# Patient Record
Sex: FEMALE | Race: WHITE | NOT HISPANIC OR LATINO | Employment: OTHER | RURAL
[De-identification: names, ages, dates, MRNs, and addresses within clinical notes are randomized per-mention and may not be internally consistent; named-entity substitution may affect disease eponyms.]

---

## 2020-08-19 ENCOUNTER — HISTORICAL (OUTPATIENT)
Dept: ADMINISTRATIVE | Facility: HOSPITAL | Age: 75
End: 2020-08-19

## 2021-10-05 ENCOUNTER — HOSPITAL ENCOUNTER (OUTPATIENT)
Dept: RADIOLOGY | Facility: HOSPITAL | Age: 76
Discharge: HOME | End: 2021-10-05
Payer: MEDICARE

## 2021-10-05 VITALS — WEIGHT: 163 LBS | BODY MASS INDEX: 28.88 KG/M2 | HEIGHT: 63 IN

## 2021-10-05 DIAGNOSIS — Z12.31 VISIT FOR SCREENING MAMMOGRAM: ICD-10-CM

## 2021-10-05 PROCEDURE — 77067 SCR MAMMO BI INCL CAD: CPT | Mod: 26,,, | Performed by: RADIOLOGY

## 2021-10-05 PROCEDURE — 77067 MAMMO DIGITAL SCREENING BILAT: ICD-10-PCS | Mod: 26,,, | Performed by: RADIOLOGY

## 2021-10-05 PROCEDURE — 77067 SCR MAMMO BI INCL CAD: CPT | Mod: TC

## 2022-11-10 ENCOUNTER — HOSPITAL ENCOUNTER (OUTPATIENT)
Dept: RADIOLOGY | Facility: HOSPITAL | Age: 77
Discharge: HOME | End: 2022-11-10
Payer: MEDICARE

## 2022-11-10 DIAGNOSIS — Z12.31 VISIT FOR SCREENING MAMMOGRAM: ICD-10-CM

## 2022-11-10 PROCEDURE — 77067 SCR MAMMO BI INCL CAD: CPT | Mod: TC

## 2022-11-10 PROCEDURE — 77067 SCR MAMMO BI INCL CAD: CPT | Mod: 26,,, | Performed by: RADIOLOGY

## 2022-11-10 PROCEDURE — 77067 MAMMO DIGITAL SCREENING BILAT: ICD-10-PCS | Mod: 26,,, | Performed by: RADIOLOGY

## 2022-11-29 ENCOUNTER — HOSPITAL ENCOUNTER (OUTPATIENT)
Dept: RADIOLOGY | Facility: HOSPITAL | Age: 77
Discharge: HOME | End: 2022-11-29
Attending: NURSE PRACTITIONER
Payer: MEDICARE

## 2022-11-29 DIAGNOSIS — R05.1 ACUTE COUGH: ICD-10-CM

## 2022-11-29 DIAGNOSIS — R05.1 ACUTE COUGH: Primary | ICD-10-CM

## 2022-11-29 PROCEDURE — 71046 X-RAY EXAM CHEST 2 VIEWS: CPT | Mod: TC

## 2023-01-10 LAB
LEFT EYE DM RETINOPATHY: NEGATIVE
RIGHT EYE DM RETINOPATHY: NEGATIVE

## 2023-02-01 ENCOUNTER — OFFICE VISIT (OUTPATIENT)
Dept: FAMILY MEDICINE | Facility: CLINIC | Age: 78
End: 2023-02-01
Payer: MEDICARE

## 2023-02-01 VITALS
BODY MASS INDEX: 28.7 KG/M2 | HEART RATE: 73 BPM | WEIGHT: 162 LBS | DIASTOLIC BLOOD PRESSURE: 75 MMHG | SYSTOLIC BLOOD PRESSURE: 135 MMHG | HEIGHT: 63 IN

## 2023-02-01 DIAGNOSIS — K44.9 HIATAL HERNIA: ICD-10-CM

## 2023-02-01 DIAGNOSIS — K21.9 GASTROESOPHAGEAL REFLUX DISEASE, UNSPECIFIED WHETHER ESOPHAGITIS PRESENT: ICD-10-CM

## 2023-02-01 DIAGNOSIS — E03.9 HYPOTHYROIDISM, UNSPECIFIED TYPE: Primary | ICD-10-CM

## 2023-02-01 DIAGNOSIS — I10 ESSENTIAL HYPERTENSION: ICD-10-CM

## 2023-02-01 DIAGNOSIS — E78.5 HYPERLIPIDEMIA, UNSPECIFIED HYPERLIPIDEMIA TYPE: ICD-10-CM

## 2023-02-01 DIAGNOSIS — M1A.9XX0 CHRONIC GOUT INVOLVING TOE OF LEFT FOOT WITHOUT TOPHUS, UNSPECIFIED CAUSE: ICD-10-CM

## 2023-02-01 DIAGNOSIS — E11.9 TYPE 2 DIABETES MELLITUS WITHOUT COMPLICATION, WITHOUT LONG-TERM CURRENT USE OF INSULIN: ICD-10-CM

## 2023-02-01 DIAGNOSIS — U09.9 POST-COVID SYNDROME: ICD-10-CM

## 2023-02-01 DIAGNOSIS — D50.8 IRON DEFICIENCY ANEMIA SECONDARY TO INADEQUATE DIETARY IRON INTAKE: ICD-10-CM

## 2023-02-01 DIAGNOSIS — J30.2 SEASONAL ALLERGIES: ICD-10-CM

## 2023-02-01 DIAGNOSIS — H40.9 GLAUCOMA OF BOTH EYES, UNSPECIFIED GLAUCOMA TYPE: ICD-10-CM

## 2023-02-01 PROBLEM — M10.9 GOUT: Status: ACTIVE | Noted: 2023-02-01

## 2023-02-01 PROBLEM — E11.42 POLYNEUROPATHY DUE TO TYPE 2 DIABETES MELLITUS: Status: ACTIVE | Noted: 2023-02-01

## 2023-02-01 PROBLEM — G25.81 RESTLESS LEGS SYNDROME: Status: ACTIVE | Noted: 2023-02-01

## 2023-02-01 PROBLEM — J30.1 ALLERGIC RHINITIS DUE TO POLLEN: Status: ACTIVE | Noted: 2023-02-01

## 2023-02-01 LAB
ALBUMIN SERPL BCP-MCNC: 3.5 G/DL (ref 3.5–5)
ALBUMIN/GLOB SERPL: 1 {RATIO}
ALP SERPL-CCNC: 110 U/L (ref 55–142)
ALT SERPL W P-5'-P-CCNC: 19 U/L (ref 13–56)
ANION GAP SERPL CALCULATED.3IONS-SCNC: 14 MMOL/L (ref 7–16)
AST SERPL W P-5'-P-CCNC: 11 U/L (ref 15–37)
BASOPHILS # BLD AUTO: 0.04 K/UL (ref 0–0.2)
BASOPHILS NFR BLD AUTO: 0.5 % (ref 0–1)
BILIRUB SERPL-MCNC: 0.4 MG/DL (ref ?–1.2)
BUN SERPL-MCNC: 27 MG/DL (ref 7–18)
BUN/CREAT SERPL: 24 (ref 6–20)
CALCIUM SERPL-MCNC: 9.9 MG/DL (ref 8.5–10.1)
CHLORIDE SERPL-SCNC: 106 MMOL/L (ref 98–107)
CHOLEST SERPL-MCNC: 163 MG/DL (ref 0–200)
CHOLEST/HDLC SERPL: 4.7 {RATIO}
CO2 SERPL-SCNC: 25 MMOL/L (ref 21–32)
CREAT SERPL-MCNC: 1.12 MG/DL (ref 0.55–1.02)
DIFFERENTIAL METHOD BLD: ABNORMAL
EGFR (NO RACE VARIABLE) (RUSH/TITUS): 51 ML/MIN/1.73M²
EOSINOPHIL # BLD AUTO: 0.16 K/UL (ref 0–0.5)
EOSINOPHIL NFR BLD AUTO: 2.2 % (ref 1–4)
ERYTHROCYTE [DISTWIDTH] IN BLOOD BY AUTOMATED COUNT: 18.1 % (ref 11.5–14.5)
EST. AVERAGE GLUCOSE BLD GHB EST-MCNC: 117 MG/DL
GLOBULIN SER-MCNC: 3.4 G/DL (ref 2–4)
GLUCOSE SERPL-MCNC: 122 MG/DL (ref 74–106)
HBA1C MFR BLD HPLC: 6.1 % (ref 4.5–6.6)
HCT VFR BLD AUTO: 39.8 % (ref 38–47)
HDLC SERPL-MCNC: 35 MG/DL (ref 40–60)
HGB BLD-MCNC: 12.3 G/DL (ref 12–16)
IMM GRANULOCYTES # BLD AUTO: 0.09 K/UL (ref 0–0.04)
IMM GRANULOCYTES NFR BLD: 1.2 % (ref 0–0.4)
LDLC SERPL CALC-MCNC: 70 MG/DL
LDLC/HDLC SERPL: 2 {RATIO}
LYMPHOCYTES # BLD AUTO: 1.54 K/UL (ref 1–4.8)
LYMPHOCYTES NFR BLD AUTO: 20.9 % (ref 27–41)
MCH RBC QN AUTO: 28 PG (ref 27–31)
MCHC RBC AUTO-ENTMCNC: 30.9 G/DL (ref 32–36)
MCV RBC AUTO: 90.5 FL (ref 80–96)
MONOCYTES # BLD AUTO: 0.55 K/UL (ref 0–0.8)
MONOCYTES NFR BLD AUTO: 7.5 % (ref 2–6)
MPC BLD CALC-MCNC: 11 FL (ref 9.4–12.4)
NEUTROPHILS # BLD AUTO: 4.99 K/UL (ref 1.8–7.7)
NEUTROPHILS NFR BLD AUTO: 67.7 % (ref 53–65)
NONHDLC SERPL-MCNC: 128 MG/DL
NRBC # BLD AUTO: 0 X10E3/UL
NRBC, AUTO (.00): 0 %
PLATELET # BLD AUTO: 288 K/UL (ref 150–400)
POTASSIUM SERPL-SCNC: 4.5 MMOL/L (ref 3.5–5.1)
PROT SERPL-MCNC: 6.9 G/DL (ref 6.4–8.2)
RBC # BLD AUTO: 4.4 M/UL (ref 4.2–5.4)
SODIUM SERPL-SCNC: 140 MMOL/L (ref 136–145)
TRIGL SERPL-MCNC: 288 MG/DL (ref 35–150)
TSH SERPL DL<=0.005 MIU/L-ACNC: 1.83 UIU/ML (ref 0.36–3.74)
URATE SERPL-MCNC: 4.9 MG/DL (ref 2.6–6)
VLDLC SERPL-MCNC: 58 MG/DL
WBC # BLD AUTO: 7.37 K/UL (ref 4.5–11)

## 2023-02-01 PROCEDURE — 85025 CBC WITH DIFFERENTIAL: ICD-10-PCS | Mod: ,,, | Performed by: CLINICAL MEDICAL LABORATORY

## 2023-02-01 PROCEDURE — 80061 LIPID PANEL: CPT | Mod: ,,, | Performed by: CLINICAL MEDICAL LABORATORY

## 2023-02-01 PROCEDURE — 84550 URIC ACID: ICD-10-PCS | Mod: ,,, | Performed by: CLINICAL MEDICAL LABORATORY

## 2023-02-01 PROCEDURE — 83036 HEMOGLOBIN A1C: ICD-10-PCS | Mod: ,,, | Performed by: CLINICAL MEDICAL LABORATORY

## 2023-02-01 PROCEDURE — 80061 LIPID PANEL: ICD-10-PCS | Mod: ,,, | Performed by: CLINICAL MEDICAL LABORATORY

## 2023-02-01 PROCEDURE — 99204 OFFICE O/P NEW MOD 45 MIN: CPT | Mod: ,,, | Performed by: FAMILY MEDICINE

## 2023-02-01 PROCEDURE — 80053 COMPREHEN METABOLIC PANEL: CPT | Mod: ,,, | Performed by: CLINICAL MEDICAL LABORATORY

## 2023-02-01 PROCEDURE — 80053 COMPREHENSIVE METABOLIC PANEL: ICD-10-PCS | Mod: ,,, | Performed by: CLINICAL MEDICAL LABORATORY

## 2023-02-01 PROCEDURE — 83036 HEMOGLOBIN GLYCOSYLATED A1C: CPT | Mod: ,,, | Performed by: CLINICAL MEDICAL LABORATORY

## 2023-02-01 PROCEDURE — 85025 COMPLETE CBC W/AUTO DIFF WBC: CPT | Mod: ,,, | Performed by: CLINICAL MEDICAL LABORATORY

## 2023-02-01 PROCEDURE — 99204 PR OFFICE/OUTPT VISIT, NEW, LEVL IV, 45-59 MIN: ICD-10-PCS | Mod: ,,, | Performed by: FAMILY MEDICINE

## 2023-02-01 PROCEDURE — 84443 TSH: ICD-10-PCS | Mod: ,,, | Performed by: CLINICAL MEDICAL LABORATORY

## 2023-02-01 PROCEDURE — 84443 ASSAY THYROID STIM HORMONE: CPT | Mod: ,,, | Performed by: CLINICAL MEDICAL LABORATORY

## 2023-02-01 PROCEDURE — 84550 ASSAY OF BLOOD/URIC ACID: CPT | Mod: ,,, | Performed by: CLINICAL MEDICAL LABORATORY

## 2023-02-01 RX ORDER — PRAVASTATIN SODIUM 40 MG/1
40 TABLET ORAL DAILY
Qty: 90 TABLET | Refills: 0 | Status: SHIPPED | OUTPATIENT
Start: 2023-02-01 | End: 2023-05-01 | Stop reason: SDUPTHER

## 2023-02-01 RX ORDER — LEVOTHYROXINE SODIUM 25 UG/1
25 TABLET ORAL
COMMUNITY
Start: 2023-01-24 | End: 2023-02-01 | Stop reason: SDUPTHER

## 2023-02-01 RX ORDER — PRAVASTATIN SODIUM 40 MG/1
1 TABLET ORAL DAILY
COMMUNITY
End: 2023-02-01 | Stop reason: SDUPTHER

## 2023-02-01 RX ORDER — GABAPENTIN 300 MG/1
1 CAPSULE ORAL
COMMUNITY
End: 2023-08-28 | Stop reason: SDUPTHER

## 2023-02-01 RX ORDER — FLUTICASONE PROPIONATE 50 MCG
SPRAY, SUSPENSION (ML) NASAL
COMMUNITY
Start: 2022-12-28

## 2023-02-01 RX ORDER — LEVOTHYROXINE SODIUM 25 UG/1
25 TABLET ORAL
Qty: 90 TABLET | Refills: 0 | Status: SHIPPED | OUTPATIENT
Start: 2023-02-01 | End: 2023-11-09 | Stop reason: SDUPTHER

## 2023-02-01 RX ORDER — OMEPRAZOLE 20 MG/1
20 CAPSULE, DELAYED RELEASE ORAL DAILY
COMMUNITY
Start: 2022-12-21 | End: 2023-02-01

## 2023-02-01 RX ORDER — ALLOPURINOL 300 MG/1
300 TABLET ORAL DAILY
Qty: 90 TABLET | Refills: 0 | Status: SHIPPED | OUTPATIENT
Start: 2023-02-01 | End: 2023-05-01 | Stop reason: SDUPTHER

## 2023-02-01 RX ORDER — GEMFIBROZIL 600 MG/1
600 TABLET, FILM COATED ORAL
COMMUNITY
Start: 2022-12-21 | End: 2023-02-01 | Stop reason: SDUPTHER

## 2023-02-01 RX ORDER — FERROUS SULFATE TAB 325 MG (65 MG ELEMENTAL FE) 325 (65 FE) MG
325 TAB ORAL EVERY OTHER DAY
COMMUNITY
Start: 2022-12-28 | End: 2023-10-02

## 2023-02-01 RX ORDER — CHLORTHALIDONE 25 MG/1
25 TABLET ORAL EVERY MORNING
COMMUNITY
Start: 2022-12-15 | End: 2023-06-21 | Stop reason: SDUPTHER

## 2023-02-01 RX ORDER — NIFEDIPINE 30 MG/1
30 TABLET, FILM COATED, EXTENDED RELEASE ORAL DAILY
COMMUNITY
Start: 2022-09-30 | End: 2023-02-01 | Stop reason: SDUPTHER

## 2023-02-01 RX ORDER — ALLOPURINOL 300 MG/1
300 TABLET ORAL
COMMUNITY
Start: 2022-09-30 | End: 2023-02-01 | Stop reason: SDUPTHER

## 2023-02-01 RX ORDER — GLIMEPIRIDE 1 MG/1
1 TABLET ORAL
COMMUNITY
Start: 2022-10-09 | End: 2023-02-01 | Stop reason: SDUPTHER

## 2023-02-01 RX ORDER — PANTOPRAZOLE SODIUM 40 MG/1
40 TABLET, DELAYED RELEASE ORAL DAILY
Qty: 90 TABLET | Refills: 3 | Status: SHIPPED | OUTPATIENT
Start: 2023-02-01 | End: 2023-11-09 | Stop reason: SDUPTHER

## 2023-02-01 RX ORDER — OMEPRAZOLE 20 MG/1
20 CAPSULE, DELAYED RELEASE ORAL DAILY
Qty: 90 CAPSULE | Refills: 0 | Status: CANCELLED | OUTPATIENT
Start: 2023-02-01

## 2023-02-01 RX ORDER — LOSARTAN POTASSIUM 100 MG/1
100 TABLET ORAL DAILY
Qty: 90 TABLET | Refills: 0 | Status: SHIPPED | OUTPATIENT
Start: 2023-02-01 | End: 2023-07-19 | Stop reason: SDUPTHER

## 2023-02-01 RX ORDER — GEMFIBROZIL 600 MG/1
600 TABLET, FILM COATED ORAL DAILY
Qty: 90 TABLET | Refills: 0 | Status: SHIPPED | OUTPATIENT
Start: 2023-02-01 | End: 2023-05-01 | Stop reason: SDUPTHER

## 2023-02-01 RX ORDER — GLIMEPIRIDE 1 MG/1
1 TABLET ORAL
Qty: 90 TABLET | Refills: 0 | Status: SHIPPED | OUTPATIENT
Start: 2023-02-01 | End: 2023-08-01

## 2023-02-01 RX ORDER — NIFEDIPINE 30 MG/1
30 TABLET, FILM COATED, EXTENDED RELEASE ORAL DAILY
Qty: 90 TABLET | Refills: 0 | Status: SHIPPED | OUTPATIENT
Start: 2023-02-01 | End: 2023-08-01 | Stop reason: SDUPTHER

## 2023-02-01 RX ORDER — LOSARTAN POTASSIUM 100 MG/1
1 TABLET ORAL DAILY
COMMUNITY
End: 2023-02-01 | Stop reason: SDUPTHER

## 2023-02-01 RX ORDER — LATANOPROST 50 UG/ML
1 SOLUTION/ DROPS OPHTHALMIC NIGHTLY
COMMUNITY
Start: 2022-08-17

## 2023-02-01 RX ORDER — METFORMIN HYDROCHLORIDE 850 MG/1
850 TABLET ORAL 2 TIMES DAILY
COMMUNITY
Start: 2022-11-15 | End: 2023-08-03

## 2023-02-01 NOTE — PROGRESS NOTES
"              Westley Lim IV, DO  [facility]  [address] [place] [address]  Phone: [phone]      Subjective     Name: Frannie Wetzel   Sex: female  YOB: 1945 (77 y.o.)  MRN: [id number]  Visit Date: 02/01/2023   Chief Complaint: Establish Care (Pt needing a new pcp)        HISTORY OF PRESENT ILLNESS:    Patient presents clinic today to establish care.  She has a medical history inclusive of diabetes type 2 with polyneuropathy, restless leg syndrome cough, glaucoma, seasonal allergies, essential hypertension, hyperlipidemia, iron deficiency anemia, hypothyroidism, gastroesophageal reflux disease, with hiatal hernia, gout, and possibly post COVID syndrome.  We talked about all these problems today and we are addressing them wanting Inna.  Medication changes that will take place today he is patient is currently on iron to be taken b.i.d. we are going to change this to q.o.d. as absorption is unlikely to be improved by taking more often than that.  Also we will be stopping the patient's of omeprazole because she is taking 1 of those +and over-the-counter to assist.  We will be changing this to Protonix 40 mg p.o. q.d. lab results reviewed from previous visits and appropriate labs will be ordered today.  Patient's only complaint for the day will be weakness and fatigue.  For this we will be getting a complete blood count, TSH, and a comprehensive metabolic panel.  Patient is also due A1c as well as a lipid panel.  Last uric acid was less than 7 and we will be repeating today for stratification.  No further changes on this visit.  Patient was invited to return to the clinic no later than 3 months from now for follow-up on diabetes.  We will be trying to lower the number of this patient's medications to avoid polypharmacy.      Portions of this note may have been created with voice recognition software. Occasional "wrong-word" or "sound-a-like" substitutions may have " occurred due to the inherent limitations of voice recognition software. Please, read the note carefully and recognize, using context, where substitutions have occurred.     PAST MEDICAL HISTORY:  Significant Diagnoses - Patient  has no past medical history on file.  Medications - Patient has a current medication list which includes the following long-term medication(s): chlorthalidone, gabapentin, latanoprost, metformin, gemfibrozil, glimepiride, levothyroxine, losartan, nifedipine, pantoprazole, and pravastatin.   Allergies - Patient has No Known Allergies.  Surgeries - Patient  has no past surgical history on file.  Family History - Patient family history is not on file.      SOCIAL HISTORY:  Tobacco - Patient  reports that she has never smoked. She has never used smokeless tobacco.   Alcohol - Patient  has no history on file for alcohol use.   Recreational Drugs - Patient  has no history on file for drug use.       Review of Systems   Constitutional:  Negative for fatigue and fever.   HENT:  Negative for nasal congestion and sore throat.    Respiratory:  Negative for cough and shortness of breath.    Cardiovascular:  Negative for chest pain.   Gastrointestinal:  Negative for abdominal pain, nausea and vomiting.   Genitourinary:  Negative for dysuria.   Musculoskeletal:  Negative for myalgias.   Integumentary:  Negative for rash.   Neurological:  Negative for dizziness, weakness and headaches.        No past medical history on file.     Review of patient's allergies indicates:  No Known Allergies     No past surgical history on file.     No family history on file.    Current Outpatient Medications   Medication Instructions    allopurinoL (ZYLOPRIM) 300 mg, Oral, Daily    chlorthalidone (HYGROTEN) 25 mg, Oral, Every morning    FeroSuL 325 mg, Oral, Every other day    fluticasone propionate (FLONASE) 50 mcg/actuation nasal spray SMARTSI Puff(s) Both Nares Daily    gabapentin (NEURONTIN) 300 MG capsule 1  "capsule    gemfibroziL (LOPID) 600 mg, Oral, Daily    glimepiride (AMARYL) 1 mg, Oral, With breakfast    latanoprost 0.005 % ophthalmic solution 1 drop, Both Eyes, Nightly    levothyroxine (SYNTHROID) 25 mcg, Oral, Before breakfast    losartan (COZAAR) 100 mg, Oral, Daily    metFORMIN (GLUCOPHAGE) 850 mg, Oral, 2 times daily    NIFEdipine (ADALAT CC) 30 mg, Oral, Daily    pantoprazole (PROTONIX) 40 mg, Oral, Daily    pravastatin (PRAVACHOL) 40 mg, Oral, Daily        Objective     /75   Pulse 73   Ht 5' 3" (1.6 m)   Wt 73.5 kg (162 lb)   BMI 28.70 kg/m²     Physical Exam  Constitutional:       General: She is not in acute distress.     Appearance: Normal appearance. She is not ill-appearing.   HENT:      Head: Normocephalic and atraumatic.      Right Ear: External ear normal.      Left Ear: External ear normal.   Eyes:      Extraocular Movements: Extraocular movements intact.      Conjunctiva/sclera: Conjunctivae normal.   Cardiovascular:      Rate and Rhythm: Normal rate.      Heart sounds: No murmur heard.  Pulmonary:      Effort: Pulmonary effort is normal.   Musculoskeletal:      Cervical back: Normal range of motion.   Skin:     General: Skin is warm and dry.      Coloration: Skin is not jaundiced.      Findings: No rash.   Neurological:      Mental Status: She is alert.   Psychiatric:         Mood and Affect: Mood normal.        All recently obtained labs have been reviewed and discussed in detail with the patient.   Assessment     1. Hypothyroidism, unspecified type    2. Essential hypertension    3. Type 2 diabetes mellitus without complication, without long-term current use of insulin    4. Gastroesophageal reflux disease, unspecified whether esophagitis present    5. Hyperlipidemia, unspecified hyperlipidemia type    6. Iron deficiency anemia secondary to inadequate dietary iron intake    7. Seasonal allergies    8. Chronic gout involving toe of left foot without tophus, unspecified cause "    9. Glaucoma of both eyes, unspecified glaucoma type    10. Hiatal hernia    11. Post-COVID syndrome         Plan        Problem List Items Addressed This Visit          Ophtho    Glaucoma of both eyes       ENT    Seasonal allergies       Cardiac/Vascular    Essential hypertension    Relevant Medications    NIFEdipine (ADALAT CC) 30 MG TbSR    losartan (COZAAR) 100 MG tablet    Other Relevant Orders    Comprehensive Metabolic Panel    Hyperlipidemia    Relevant Medications    gemfibroziL (LOPID) 600 MG tablet    pravastatin (PRAVACHOL) 40 MG tablet    Other Relevant Orders    Lipid Panel       ID    Post-COVID syndrome       Oncology    Iron deficiency anemia secondary to inadequate dietary iron intake    Relevant Orders    CBC Auto Differential       Endocrine    Type 2 diabetes mellitus without complication    Relevant Medications    metFORMIN (GLUCOPHAGE) 850 MG tablet    glimepiride (AMARYL) 1 MG tablet    Other Relevant Orders    Hemoglobin A1C    Hypothyroidism - Primary    Relevant Medications    levothyroxine (SYNTHROID) 25 MCG tablet    Other Relevant Orders    TSH       GI    Gastroesophageal reflux disease    Relevant Medications    pantoprazole (PROTONIX) 40 MG tablet    Hiatal hernia       Orthopedic    Gout    Relevant Medications    allopurinoL (ZYLOPRIM) 300 MG tablet    Other Relevant Orders    Uric Acid       No follow-ups on file.    Patient advised that is symptoms worsen, they should call or report directly to local emergency department.    Westley Lim DO  [facility]

## 2023-02-09 DIAGNOSIS — Z71.89 COMPLEX CARE COORDINATION: ICD-10-CM

## 2023-03-21 ENCOUNTER — OFFICE VISIT (OUTPATIENT)
Dept: FAMILY MEDICINE | Facility: CLINIC | Age: 78
End: 2023-03-21
Payer: MEDICARE

## 2023-03-21 VITALS
TEMPERATURE: 97 F | BODY MASS INDEX: 28.7 KG/M2 | HEART RATE: 97 BPM | WEIGHT: 162 LBS | HEIGHT: 63 IN | SYSTOLIC BLOOD PRESSURE: 149 MMHG | DIASTOLIC BLOOD PRESSURE: 73 MMHG

## 2023-03-21 DIAGNOSIS — M62.838 MUSCLE SPASM: ICD-10-CM

## 2023-03-21 DIAGNOSIS — E11.42 POLYNEUROPATHY DUE TO TYPE 2 DIABETES MELLITUS: ICD-10-CM

## 2023-03-21 DIAGNOSIS — K59.1 FUNCTIONAL DIARRHEA: Primary | ICD-10-CM

## 2023-03-21 DIAGNOSIS — M79.10 MYALGIA: ICD-10-CM

## 2023-03-21 DIAGNOSIS — N18.31 CHRONIC KIDNEY DISEASE, STAGE 3A: ICD-10-CM

## 2023-03-21 PROCEDURE — 99214 PR OFFICE/OUTPT VISIT, EST, LEVL IV, 30-39 MIN: ICD-10-PCS | Mod: ,,, | Performed by: FAMILY MEDICINE

## 2023-03-21 PROCEDURE — 99214 OFFICE O/P EST MOD 30 MIN: CPT | Mod: ,,, | Performed by: FAMILY MEDICINE

## 2023-03-21 RX ORDER — METHOCARBAMOL 500 MG/1
500 TABLET, FILM COATED ORAL 4 TIMES DAILY
Qty: 40 TABLET | Refills: 0 | Status: SHIPPED | OUTPATIENT
Start: 2023-03-21 | End: 2023-03-31

## 2023-03-21 NOTE — PROGRESS NOTES
"              Westley Lim IV, DO  [facility] [place]  [address], [place] [address]  Phone: [phone]      Subjective     Name: Frannie Wetzel   Sex: female  YOB: 1945 (77 y.o.)  MRN: [id number]  Visit Date: 03/21/2023   Chief Complaint: Fatigue (Not eating/Started thursday), Generalized Body Aches, and Diarrhea        HISTORY OF PRESENT ILLNESS:    Chief Complaint   Patient presents with    Fatigue     Not eating  Started thursday    Generalized Body Aches    Diarrhea       HPI  See tests that keep you healthy and the problem oriented documentation below.    Tests to Keep You Healthy    Colon Cancer Screening: DUE  Last Blood Pressure <= 139/89 (3/21/2023): Yes        Portions of this note may have been created with voice recognition software. Occasional "wrong-word" or "sound-a-like" substitutions may have occurred due to the inherent limitations of voice recognition software. Please, read the note carefully and recognize, using context, where substitutions have occurred.     PAST MEDICAL HISTORY:  Significant Diagnoses - Patient  has no past medical history on file.  Medications - Patient has a current medication list which includes the following long-term medication(s): chlorthalidone, gabapentin, gemfibrozil, glimepiride, latanoprost, levothyroxine, losartan, metformin, nifedipine, pantoprazole, and pravastatin.   Allergies - Patient has No Known Allergies.  Surgeries - Patient  has no past surgical history on file.  Family History - Patient family history is not on file.      SOCIAL HISTORY:  Tobacco - Patient  reports that she has never smoked. She has never used smokeless tobacco.   Alcohol - Patient  has no history on file for alcohol use.   Recreational Drugs - Patient  has no history on file for drug use.       Review of Systems   All other systems reviewed and are negative.       History reviewed. No pertinent past medical history.     Review of patient's " "allergies indicates:  No Known Allergies     History reviewed. No pertinent surgical history.     History reviewed. No pertinent family history.    Current Outpatient Medications   Medication Instructions    allopurinoL (ZYLOPRIM) 300 mg, Oral, Daily    chlorthalidone (HYGROTEN) 25 mg, Oral, Every morning    FeroSuL 325 mg, Oral, Every other day    fluticasone propionate (FLONASE) 50 mcg/actuation nasal spray SMARTSI Puff(s) Both Nares Daily    gabapentin (NEURONTIN) 300 MG capsule 1 capsule    gemfibroziL (LOPID) 600 mg, Oral, Daily    glimepiride (AMARYL) 1 mg, Oral, With breakfast    latanoprost 0.005 % ophthalmic solution 1 drop, Both Eyes, Nightly    levothyroxine (SYNTHROID) 25 mcg, Oral, Before breakfast    losartan (COZAAR) 100 mg, Oral, Daily    metFORMIN (GLUCOPHAGE) 850 mg, Oral, 2 times daily    NIFEdipine (ADALAT CC) 30 mg, Oral, Daily    pantoprazole (PROTONIX) 40 mg, Oral, Daily    pravastatin (PRAVACHOL) 40 mg, Oral, Daily        Objective     BP (!) 149/73   Pulse 97   Temp 97 °F (36.1 °C)   Ht 5' 3" (1.6 m)   Wt 73.5 kg (162 lb)   BMI 28.70 kg/m²     Physical Exam  Constitutional:       General: She is not in acute distress.     Appearance: Normal appearance. She is not ill-appearing.   HENT:      Head: Normocephalic and atraumatic.      Right Ear: External ear normal.      Left Ear: External ear normal.   Eyes:      Extraocular Movements: Extraocular movements intact.      Conjunctiva/sclera: Conjunctivae normal.   Cardiovascular:      Rate and Rhythm: Normal rate.      Heart sounds: No murmur heard.  Pulmonary:      Effort: Pulmonary effort is normal.   Musculoskeletal:      Cervical back: Normal range of motion.   Skin:     General: Skin is warm and dry.      Coloration: Skin is not jaundiced.      Findings: No rash.   Neurological:      Mental Status: She is alert.   Psychiatric:         Mood and Affect: Mood normal.        All recently obtained labs have been reviewed and " discussed in detail with the patient.   Assessment     1. Functional diarrhea    2. Myalgia    3. Muscle spasm    4. Chronic kidney disease, stage 3a    5. Polyneuropathy due to type 2 diabetes mellitus         Plan        Problem List Items Addressed This Visit          Neuro    Polyneuropathy due to type 2 diabetes mellitus       Renal/    Chronic kidney disease, stage 3a       GI    Functional diarrhea - Primary     Patient has diarrhea for 4 hours now [place] stool type 6.  She has tried Imodium that has not relieved his this time.  I have advised her to rule out run its course for the next 24 hours before trying Imodium again and after 3-4 days if there is no relief whatsoever this and she should come back for ova and parasite as well as enteric pathogen studies with stool sample.            Orthopedic    Muscle spasm     Patient a lot of traveling over the last week basis of 100 miles.  She does have some generalized myalgias and possible muscle spasms.  We will be treating this with methocarbamol 500 mg p.o. q.i.d. p.r.n. for muscle spasms for a total of 10 days.         Myalgia       No follow-ups on file.    Patient advised that is symptoms worsen, they should call or report directly to local emergency department.    Westley Lim,   [facility]

## 2023-03-21 NOTE — ASSESSMENT & PLAN NOTE
Patient a lot of traveling over the last week basis of 100 miles.  She does have some generalized myalgias and possible muscle spasms.  We will be treating this with methocarbamol 500 mg p.o. q.i.d. p.r.n. for muscle spasms for a total of 10 days.

## 2023-03-28 ENCOUNTER — OFFICE VISIT (OUTPATIENT)
Dept: FAMILY MEDICINE | Facility: CLINIC | Age: 78
End: 2023-03-28
Payer: MEDICARE

## 2023-03-28 VITALS
OXYGEN SATURATION: 97 % | TEMPERATURE: 98 F | HEART RATE: 71 BPM | SYSTOLIC BLOOD PRESSURE: 116 MMHG | WEIGHT: 158 LBS | DIASTOLIC BLOOD PRESSURE: 68 MMHG | HEIGHT: 63 IN | BODY MASS INDEX: 28 KG/M2

## 2023-03-28 DIAGNOSIS — K59.1 FUNCTIONAL DIARRHEA: Primary | ICD-10-CM

## 2023-03-28 LAB
ANION GAP SERPL CALCULATED.3IONS-SCNC: 12 MMOL/L (ref 7–16)
BASOPHILS # BLD AUTO: 0.06 K/UL (ref 0–0.2)
BASOPHILS NFR BLD AUTO: 0.4 % (ref 0–1)
BASOPHILS NFR BLD MANUAL: 1 % (ref 0–1)
BUN SERPL-MCNC: 24 MG/DL (ref 7–18)
BUN/CREAT SERPL: 17 (ref 6–20)
CALCIUM SERPL-MCNC: 9.7 MG/DL (ref 8.5–10.1)
CHLORIDE SERPL-SCNC: 102 MMOL/L (ref 98–107)
CO2 SERPL-SCNC: 25 MMOL/L (ref 21–32)
CREAT SERPL-MCNC: 1.45 MG/DL (ref 0.55–1.02)
DIFFERENTIAL METHOD BLD: ABNORMAL
EGFR (NO RACE VARIABLE) (RUSH/TITUS): 38 ML/MIN/1.73M²
EOSINOPHIL # BLD AUTO: 0.03 K/UL (ref 0–0.5)
EOSINOPHIL NFR BLD AUTO: 0.2 % (ref 1–4)
ERYTHROCYTE [DISTWIDTH] IN BLOOD BY AUTOMATED COUNT: 15.6 % (ref 11.5–14.5)
GLUCOSE SERPL-MCNC: 109 MG/DL (ref 74–106)
HCT VFR BLD AUTO: 34.8 % (ref 38–47)
HGB BLD-MCNC: 11.1 G/DL (ref 12–16)
IMM GRANULOCYTES # BLD AUTO: 0.54 K/UL (ref 0–0.04)
IMM GRANULOCYTES NFR BLD: 3.7 % (ref 0–0.4)
LYMPHOCYTES # BLD AUTO: 1.38 K/UL (ref 1–4.8)
LYMPHOCYTES NFR BLD AUTO: 9.3 % (ref 27–41)
LYMPHOCYTES NFR BLD MANUAL: 11 % (ref 27–41)
MCH RBC QN AUTO: 28.8 PG (ref 27–31)
MCHC RBC AUTO-ENTMCNC: 31.9 G/DL (ref 32–36)
MCV RBC AUTO: 90.2 FL (ref 80–96)
MONOCYTES # BLD AUTO: 0.86 K/UL (ref 0–0.8)
MONOCYTES NFR BLD AUTO: 5.8 % (ref 2–6)
MONOCYTES NFR BLD MANUAL: 5 % (ref 2–6)
MPC BLD CALC-MCNC: 10.7 FL (ref 9.4–12.4)
NEUTROPHILS # BLD AUTO: 11.92 K/UL (ref 1.8–7.7)
NEUTROPHILS NFR BLD AUTO: 80.6 % (ref 53–65)
NEUTS SEG NFR BLD MANUAL: 83 % (ref 50–62)
NRBC # BLD AUTO: 0.03 X10E3/UL
NRBC BLD MANUAL-RTO: 1 /100 WBC
NRBC, AUTO (.00): 0.2 %
PLATELET # BLD AUTO: 400 K/UL (ref 150–400)
PLATELET MORPHOLOGY: ABNORMAL
POTASSIUM SERPL-SCNC: 4.4 MMOL/L (ref 3.5–5.1)
RBC # BLD AUTO: 3.86 M/UL (ref 4.2–5.4)
RBC MORPH BLD: NORMAL
SODIUM SERPL-SCNC: 135 MMOL/L (ref 136–145)
WBC # BLD AUTO: 14.79 K/UL (ref 4.5–11)

## 2023-03-28 PROCEDURE — 80048 BASIC METABOLIC PANEL: ICD-10-PCS | Mod: ,,, | Performed by: CLINICAL MEDICAL LABORATORY

## 2023-03-28 PROCEDURE — 99213 PR OFFICE/OUTPT VISIT, EST, LEVL III, 20-29 MIN: ICD-10-PCS | Mod: ,,, | Performed by: FAMILY MEDICINE

## 2023-03-28 PROCEDURE — 85025 COMPLETE CBC W/AUTO DIFF WBC: CPT | Mod: ,,, | Performed by: CLINICAL MEDICAL LABORATORY

## 2023-03-28 PROCEDURE — 99213 OFFICE O/P EST LOW 20 MIN: CPT | Mod: ,,, | Performed by: FAMILY MEDICINE

## 2023-03-28 PROCEDURE — 85025 CBC WITH DIFFERENTIAL: ICD-10-PCS | Mod: ,,, | Performed by: CLINICAL MEDICAL LABORATORY

## 2023-03-28 PROCEDURE — 80048 BASIC METABOLIC PNL TOTAL CA: CPT | Mod: ,,, | Performed by: CLINICAL MEDICAL LABORATORY

## 2023-03-28 NOTE — PROGRESS NOTES
"              Westley Lim IV, DO  [facility] [place]  [address], [place] [address]  Phone: [phone]      Subjective     Name: Frannie Wetzel   Sex: female  YOB: 1945 (77 y.o.)  MRN: [id number]  Visit Date: 03/28/2023   Chief Complaint: Extremity Weakness (Pt has not eaten in a week, cant walk, possible uti )        HISTORY OF PRESENT ILLNESS:    Chief Complaint   Patient presents with    Extremity Weakness     Pt has not eaten in a week, cant walk, possible uti        HPI  See tests that keep you healthy and the problem oriented documentation below.    Tests to Keep You Healthy    Eye Exam: DUE  Colon Cancer Screening: DUE  Last Blood Pressure <= 139/89 (3/28/2023): Yes  Last HbA1c < 8 (02/01/2023): Yes        Portions of this note may have been created with voice recognition software. Occasional "wrong-word" or "sound-a-like" substitutions may have occurred due to the inherent limitations of voice recognition software. Please, read the note carefully and recognize, using context, where substitutions have occurred.     PAST MEDICAL HISTORY:  Significant Diagnoses - Patient  has no past medical history on file.  Medications - Patient has a current medication list which includes the following long-term medication(s): chlorthalidone, gabapentin, gemfibrozil, glimepiride, latanoprost, levothyroxine, losartan, metformin, nifedipine, pantoprazole, and pravastatin.   Allergies - Patient has No Known Allergies.  Surgeries - Patient  has no past surgical history on file.  Family History - Patient family history is not on file.      SOCIAL HISTORY:  Tobacco - Patient  reports that she has never smoked. She has never used smokeless tobacco.   Alcohol - Patient  has no history on file for alcohol use.   Recreational Drugs - Patient  has no history on file for drug use.       Review of Systems   All other systems reviewed and are negative.       No past medical history on file. " "    Review of patient's allergies indicates:  No Known Allergies     No past surgical history on file.     No family history on file.    Current Outpatient Medications   Medication Instructions    allopurinoL (ZYLOPRIM) 300 mg, Oral, Daily    chlorthalidone (HYGROTEN) 25 mg, Oral, Every morning    FeroSuL 325 mg, Oral, Every other day    fluticasone propionate (FLONASE) 50 mcg/actuation nasal spray SMARTSI Puff(s) Both Nares Daily    gabapentin (NEURONTIN) 300 MG capsule 1 capsule    gemfibroziL (LOPID) 600 mg, Oral, Daily    glimepiride (AMARYL) 1 mg, Oral, With breakfast    latanoprost 0.005 % ophthalmic solution 1 drop, Both Eyes, Nightly    levothyroxine (SYNTHROID) 25 mcg, Oral, Before breakfast    losartan (COZAAR) 100 mg, Oral, Daily    metFORMIN (GLUCOPHAGE) 850 mg, Oral, 2 times daily    methocarbamoL (ROBAXIN) 500 mg, Oral, 4 times daily    NIFEdipine (ADALAT CC) 30 mg, Oral, Daily    pantoprazole (PROTONIX) 40 mg, Oral, Daily    pravastatin (PRAVACHOL) 40 mg, Oral, Daily        Objective     /68   Pulse 71   Temp 97.8 °F (36.6 °C)   Ht 5' 3" (1.6 m)   Wt 71.7 kg (158 lb)   SpO2 97%   BMI 27.99 kg/m²     Physical Exam  Constitutional:       General: She is not in acute distress.     Appearance: Normal appearance. She is not ill-appearing.   HENT:      Head: Normocephalic and atraumatic.      Right Ear: External ear normal.      Left Ear: External ear normal.   Eyes:      Extraocular Movements: Extraocular movements intact.      Conjunctiva/sclera: Conjunctivae normal.   Cardiovascular:      Rate and Rhythm: Normal rate.      Heart sounds: No murmur heard.  Pulmonary:      Effort: Pulmonary effort is normal.   Musculoskeletal:      Cervical back: Normal range of motion.   Skin:     General: Skin is warm and dry.      Coloration: Skin is not jaundiced.      Findings: No rash.   Neurological:      Mental Status: She is alert.      Motor: Weakness present.   Psychiatric:         " Mood and Affect: Mood normal.        All recently obtained labs have been reviewed and discussed in detail with the patient.   Assessment     1. Functional diarrhea         Plan        Problem List Items Addressed This Visit          GI    Functional diarrhea - Primary     Patient has had diarrhea since visiting the [facility] recently.  She was drinking well water at the time.  I am going to do an enteric pathogen panel as well as ova and parasite.  We will be getting a CBC as well as a BMP today as.  Patient does have weakness in both her lower extremities.  This could be secondary to muscle relaxer patient was started on previously and we will hold that at this time.  Considering the ascending weakness and possible paralysis of the lower extremities largest concern would be for Guillain-[place] syndrome secondary to Campylobacter jejuni.         Relevant Orders    Ova and Parasite Exam    Enteric Pathogen Panel    CBC Auto Differential    Basic Metabolic Panel       No follow-ups on file.    Patient advised that is symptoms worsen, they should call or report directly to local emergency department.    Westley Lim,   [facility]

## 2023-03-28 NOTE — ASSESSMENT & PLAN NOTE
[place]  She was drinking well water at the time.  I am going to do an enteric pathogen panel as well as ova and parasite.  We will be getting a CBC as well as a BMP today as.  Patient does have weakness in both her lower extremities.  This could be secondary to muscle relaxer patient was started on previously and we will hold that at this time.  Considering the ascending weakness and possible paralysis of the lower extremities largest concern would be for Guillain-[place] syndrome secondary to Campylobacter jejuni.

## 2023-03-29 DIAGNOSIS — K52.9 GASTROENTERITIS: Primary | ICD-10-CM

## 2023-03-29 RX ORDER — AZITHROMYCIN 500 MG/1
500 TABLET, FILM COATED ORAL DAILY
Qty: 3 TABLET | Refills: 0 | Status: SHIPPED | OUTPATIENT
Start: 2023-03-29 | End: 2023-04-01

## 2023-03-30 PROCEDURE — 87209 SMEAR COMPLEX STAIN: CPT | Mod: ,,, | Performed by: CLINICAL MEDICAL LABORATORY

## 2023-03-30 PROCEDURE — 87177 OVA AND PARASITE EXAM: ICD-10-PCS | Mod: ,,, | Performed by: CLINICAL MEDICAL LABORATORY

## 2023-03-30 PROCEDURE — 87209 OVA AND PARASITE EXAM: ICD-10-PCS | Mod: ,,, | Performed by: CLINICAL MEDICAL LABORATORY

## 2023-03-30 PROCEDURE — 87506 IADNA-DNA/RNA PROBE TQ 6-11: CPT | Mod: GZ,,, | Performed by: CLINICAL MEDICAL LABORATORY

## 2023-03-30 PROCEDURE — 87506 ENTERIC PATHOGEN PANEL: ICD-10-PCS | Mod: GZ,,, | Performed by: CLINICAL MEDICAL LABORATORY

## 2023-03-30 PROCEDURE — 87177 OVA AND PARASITES SMEARS: CPT | Mod: ,,, | Performed by: CLINICAL MEDICAL LABORATORY

## 2023-03-31 LAB
C COLI+JEJ+UPSA DNA STL QL NAA+NON-PROBE: NEGATIVE
E COLI SXT1 STL QL IA: NEGATIVE
E COLI SXT2 STL QL IA: NEGATIVE
NOROVIRUS GI+II RNA STL QL NAA+NON-PROBE: NEGATIVE
RVA RNA STL QL NAA+NON-PROBE: NEGATIVE
S ENT+BONG DNA STL QL NAA+NON-PROBE: NEGATIVE
SHIGELLA SPECIES NAT: NEGATIVE
V CHOL+PARA+VUL DNA STL QL NAA+NON-PROBE: NEGATIVE
Y ENTEROCOL DNA STL QL NAA+NON-PROBE: NEGATIVE

## 2023-04-02 LAB
O+P STL CONC: NORMAL
TRICHROME SMEAR: NORMAL

## 2023-04-28 ENCOUNTER — PATIENT OUTREACH (OUTPATIENT)
Dept: ADMINISTRATIVE | Facility: HOSPITAL | Age: 78
End: 2023-04-28

## 2023-04-28 NOTE — PROGRESS NOTES
Health Maintenance Due   Topic Date Due    Hepatitis C Screening  Never done    Diabetes Urine Screening  Never done    Eye Exam  Never done    TETANUS VACCINE  Never done    DEXA Scan  Never done    Shingles Vaccine (1 of 2) Never done    COVID-19 Vaccine (2 - Moderna series) 04/15/2022    Influenza Vaccine (1) Never done    Chart Review. Chart review completed for HM test overdue (mammograms, Colonoscopies, pap smears, DM labs, and/or EYE EXAMs)   Care Everywhere and media, updates requested and reviewed.   [facility] and [facility] reviewed.    [facility] reviewed for immunizations and added to the chart     note added to appointment details

## 2023-05-01 ENCOUNTER — OFFICE VISIT (OUTPATIENT)
Dept: FAMILY MEDICINE | Facility: CLINIC | Age: 78
End: 2023-05-01
Payer: MEDICARE

## 2023-05-01 VITALS
WEIGHT: 158 LBS | DIASTOLIC BLOOD PRESSURE: 66 MMHG | HEART RATE: 78 BPM | SYSTOLIC BLOOD PRESSURE: 125 MMHG | HEIGHT: 63 IN | BODY MASS INDEX: 28 KG/M2

## 2023-05-01 DIAGNOSIS — E78.5 HYPERLIPIDEMIA, UNSPECIFIED HYPERLIPIDEMIA TYPE: ICD-10-CM

## 2023-05-01 DIAGNOSIS — M1A.9XX0 CHRONIC GOUT INVOLVING TOE OF LEFT FOOT WITHOUT TOPHUS, UNSPECIFIED CAUSE: ICD-10-CM

## 2023-05-01 PROCEDURE — 99214 OFFICE O/P EST MOD 30 MIN: CPT | Mod: ,,, | Performed by: FAMILY MEDICINE

## 2023-05-01 PROCEDURE — 99214 PR OFFICE/OUTPT VISIT, EST, LEVL IV, 30-39 MIN: ICD-10-PCS | Mod: ,,, | Performed by: FAMILY MEDICINE

## 2023-05-01 RX ORDER — PRAVASTATIN SODIUM 40 MG/1
40 TABLET ORAL DAILY
Qty: 90 TABLET | Refills: 1 | Status: SHIPPED | OUTPATIENT
Start: 2023-05-01 | End: 2023-11-09 | Stop reason: SDUPTHER

## 2023-05-01 RX ORDER — ALLOPURINOL 300 MG/1
300 TABLET ORAL DAILY
Qty: 90 TABLET | Refills: 1 | Status: SHIPPED | OUTPATIENT
Start: 2023-05-01 | End: 2023-11-09 | Stop reason: SDUPTHER

## 2023-05-01 RX ORDER — GEMFIBROZIL 600 MG/1
600 TABLET, FILM COATED ORAL DAILY
Qty: 90 TABLET | Refills: 1 | Status: SHIPPED | OUTPATIENT
Start: 2023-05-01 | End: 2023-11-09 | Stop reason: SDUPTHER

## 2023-05-01 NOTE — PROGRESS NOTES
"              Westley Lim IV, DO  [facility] [place]  [address], [place] [address]  Phone: [phone]      Subjective     Name: Frannie Wetzel   Sex: female  YOB: 1945 (78 y.o.)  MRN: [id number]  Visit Date: 05/01/2023   Chief Complaint: check up and Medication Refill        HISTORY OF PRESENT ILLNESS:    Chief Complaint   Patient presents with    check up    Medication Refill       HPI  See tests that keep you healthy and the problem oriented documentation below.    Tests to Keep You Healthy    Eye Exam: DUE  Last Blood Pressure <= 139/89 (5/1/2023): Yes        Portions of this note may have been created with voice recognition software. Occasional "wrong-word" or "sound-a-like" substitutions may have occurred due to the inherent limitations of voice recognition software. Please, read the note carefully and recognize, using context, where substitutions have occurred.     PAST MEDICAL HISTORY:  Significant Diagnoses - Patient  has a past medical history of Diabetes mellitus, type 2, Hyperlipidemia, and Hypothyroidism.  Medications - Patient has a current medication list which includes the following long-term medication(s): chlorthalidone, gabapentin, gemfibrozil, glimepiride, latanoprost, levothyroxine, losartan, metformin, nifedipine, pantoprazole, and pravastatin.   Allergies - Patient has No Known Allergies.  Surgeries - Patient  has no past surgical history on file.  Family History - Patient family history is not on file.      SOCIAL HISTORY:  Tobacco - Patient  reports that she has never smoked. She has never used smokeless tobacco.   Alcohol - Patient  reports no history of alcohol use.   Recreational Drugs - Patient  has no history on file for drug use.       Review of Systems   All other systems reviewed and are negative.       Past Medical History:   Diagnosis Date    Diabetes mellitus, type 2     Hyperlipidemia     Hypothyroidism         Review of patient's " "allergies indicates:  No Known Allergies     History reviewed. No pertinent surgical history.     History reviewed. No pertinent family history.    Current Outpatient Medications   Medication Instructions    allopurinoL (ZYLOPRIM) 300 mg, Oral, Daily    chlorthalidone (HYGROTEN) 25 mg, Oral, Every morning    FeroSuL 325 mg, Oral, Every other day    fluticasone propionate (FLONASE) 50 mcg/actuation nasal spray SMARTSI Puff(s) Both Nares Daily    gabapentin (NEURONTIN) 300 MG capsule 1 capsule    gemfibroziL (LOPID) 600 mg, Oral, Daily    glimepiride (AMARYL) 1 mg, Oral, With breakfast    latanoprost 0.005 % ophthalmic solution 1 drop, Both Eyes, Nightly    levothyroxine (SYNTHROID) 25 mcg, Oral, Before breakfast    losartan (COZAAR) 100 mg, Oral, Daily    metFORMIN (GLUCOPHAGE) 850 mg, Oral, 2 times daily    NIFEdipine (ADALAT CC) 30 mg, Oral, Daily    pantoprazole (PROTONIX) 40 mg, Oral, Daily    pravastatin (PRAVACHOL) 40 mg, Oral, Daily        Objective     /66   Pulse 78   Ht 5' 3" (1.6 m)   Wt 71.7 kg (158 lb)   BMI 27.99 kg/m²     Physical Exam  Constitutional:       General: She is not in acute distress.     Appearance: Normal appearance. She is not ill-appearing.   HENT:      Head: Normocephalic and atraumatic.      Right Ear: External ear normal.      Left Ear: External ear normal.   Eyes:      Extraocular Movements: Extraocular movements intact.      Conjunctiva/sclera: Conjunctivae normal.   Cardiovascular:      Rate and Rhythm: Normal rate.      Heart sounds: No murmur heard.  Pulmonary:      Effort: Pulmonary effort is normal.   Musculoskeletal:      Cervical back: Normal range of motion.   Skin:     General: Skin is warm and dry.      Coloration: Skin is not jaundiced.      Findings: No rash.   Neurological:      Mental Status: She is alert.   Psychiatric:         Mood and Affect: Mood normal.        All recently obtained labs have been reviewed and discussed in detail with " the patient.   Assessment     1. Hyperlipidemia, unspecified hyperlipidemia type    2. Chronic gout involving toe of left foot without tophus, unspecified cause         Plan        Problem List Items Addressed This Visit          Cardiac/Vascular    Hyperlipidemia    Relevant Medications    pravastatin (PRAVACHOL) 40 MG tablet    gemfibroziL (LOPID) 600 MG tablet       Orthopedic    Gout    Relevant Medications    allopurinoL (ZYLOPRIM) 300 MG tablet       No follow-ups on file.    Patient advised that is symptoms worsen, they should call or report directly to local emergency department.    Westley Lim DO  [facility]

## 2023-05-02 ENCOUNTER — TELEPHONE (OUTPATIENT)
Dept: FAMILY MEDICINE | Facility: CLINIC | Age: 78
End: 2023-05-02
Payer: MEDICARE

## 2023-05-02 DIAGNOSIS — R30.0 DYSURIA: Primary | ICD-10-CM

## 2023-05-02 RX ORDER — SULFAMETHOXAZOLE AND TRIMETHOPRIM 800; 160 MG/1; MG/1
1 TABLET ORAL 2 TIMES DAILY
Qty: 10 TABLET | Refills: 0 | Status: SHIPPED | OUTPATIENT
Start: 2023-05-02 | End: 2023-05-07

## 2023-05-02 NOTE — TELEPHONE ENCOUNTER
Pt called requesting antibiotic for uti. She is having burning on urination and urinary frequency.  States she mentioned it yesterday at office visit.  She uses the pharmacy of [place].

## 2023-05-22 NOTE — ASSESSMENT & PLAN NOTE
Patient has diarrhea for 4 hours now [place] stool type 6.  She has tried Imodium that has not relieved his this time.  I have advised her to rule out run its course for the next 24 hours before trying Imodium again and after 3-4 days if there is no relief whatsoever this and she should come back for ova and parasite as well as enteric pathogen studies with stool sample.   Referring Physician (Optional): Ema Steinberg PA-C

## 2023-06-21 RX ORDER — CHLORTHALIDONE 25 MG/1
25 TABLET ORAL EVERY MORNING
Qty: 30 TABLET | Refills: 2 | Status: SHIPPED | OUTPATIENT
Start: 2023-06-21

## 2023-07-19 DIAGNOSIS — I10 ESSENTIAL HYPERTENSION: ICD-10-CM

## 2023-07-19 RX ORDER — LOSARTAN POTASSIUM 100 MG/1
100 TABLET ORAL DAILY
Qty: 90 TABLET | Refills: 1 | Status: SHIPPED | OUTPATIENT
Start: 2023-07-19 | End: 2023-10-02

## 2023-08-01 ENCOUNTER — OFFICE VISIT (OUTPATIENT)
Dept: FAMILY MEDICINE | Facility: CLINIC | Age: 78
End: 2023-08-01
Payer: MEDICARE

## 2023-08-01 VITALS
HEART RATE: 75 BPM | WEIGHT: 158.81 LBS | HEIGHT: 63 IN | SYSTOLIC BLOOD PRESSURE: 145 MMHG | BODY MASS INDEX: 28.14 KG/M2 | DIASTOLIC BLOOD PRESSURE: 66 MMHG

## 2023-08-01 DIAGNOSIS — N18.31 CHRONIC KIDNEY DISEASE, STAGE 3A: Primary | ICD-10-CM

## 2023-08-01 DIAGNOSIS — E21.3 HYPERPARATHYROIDISM: ICD-10-CM

## 2023-08-01 DIAGNOSIS — I10 ESSENTIAL HYPERTENSION: ICD-10-CM

## 2023-08-01 DIAGNOSIS — E11.9 TYPE 2 DIABETES MELLITUS WITHOUT COMPLICATION, WITHOUT LONG-TERM CURRENT USE OF INSULIN: ICD-10-CM

## 2023-08-01 DIAGNOSIS — E78.5 HYPERLIPIDEMIA, UNSPECIFIED HYPERLIPIDEMIA TYPE: ICD-10-CM

## 2023-08-01 DIAGNOSIS — R30.0 DYSURIA: ICD-10-CM

## 2023-08-01 LAB
ANION GAP SERPL CALCULATED.3IONS-SCNC: 10 MMOL/L (ref 7–16)
BUN SERPL-MCNC: 23 MG/DL (ref 7–18)
BUN/CREAT SERPL: 20 (ref 6–20)
CALCIUM SERPL-MCNC: 10.5 MG/DL (ref 8.5–10.1)
CHLORIDE SERPL-SCNC: 107 MMOL/L (ref 98–107)
CO2 SERPL-SCNC: 26 MMOL/L (ref 21–32)
CREAT SERPL-MCNC: 1.16 MG/DL (ref 0.55–1.02)
CREAT UR-MCNC: 98 MG/DL (ref 28–219)
EGFR (NO RACE VARIABLE) (RUSH/TITUS): 49 ML/MIN/1.73M2
EST. AVERAGE GLUCOSE BLD GHB EST-MCNC: 184 MG/DL
GLUCOSE SERPL-MCNC: 173 MG/DL (ref 74–106)
HBA1C MFR BLD HPLC: 8.1 % (ref 4.5–6.6)
MICROALBUMIN UR-MCNC: 3.8 MG/DL (ref 0–2.8)
MICROALBUMIN/CREAT RATIO PNL UR: 38.8 MG/G (ref 0–30)
POTASSIUM SERPL-SCNC: 4.1 MMOL/L (ref 3.5–5.1)
SODIUM SERPL-SCNC: 139 MMOL/L (ref 136–145)

## 2023-08-01 PROCEDURE — 82043 MICROALBUMIN / CREATININE RATIO URINE: ICD-10-PCS | Mod: ,,, | Performed by: CLINICAL MEDICAL LABORATORY

## 2023-08-01 PROCEDURE — 99214 OFFICE O/P EST MOD 30 MIN: CPT | Mod: ,,, | Performed by: FAMILY MEDICINE

## 2023-08-01 PROCEDURE — 82570 MICROALBUMIN / CREATININE RATIO URINE: ICD-10-PCS | Mod: ,,, | Performed by: CLINICAL MEDICAL LABORATORY

## 2023-08-01 PROCEDURE — 82570 ASSAY OF URINE CREATININE: CPT | Mod: ,,, | Performed by: CLINICAL MEDICAL LABORATORY

## 2023-08-01 PROCEDURE — 99214 PR OFFICE/OUTPT VISIT, EST, LEVL IV, 30-39 MIN: ICD-10-PCS | Mod: ,,, | Performed by: FAMILY MEDICINE

## 2023-08-01 PROCEDURE — 83036 HEMOGLOBIN GLYCOSYLATED A1C: CPT | Mod: ,,, | Performed by: CLINICAL MEDICAL LABORATORY

## 2023-08-01 PROCEDURE — 82043 UR ALBUMIN QUANTITATIVE: CPT | Mod: ,,, | Performed by: CLINICAL MEDICAL LABORATORY

## 2023-08-01 PROCEDURE — 80048 BASIC METABOLIC PANEL: ICD-10-PCS | Mod: ,,, | Performed by: CLINICAL MEDICAL LABORATORY

## 2023-08-01 PROCEDURE — 80048 BASIC METABOLIC PNL TOTAL CA: CPT | Mod: ,,, | Performed by: CLINICAL MEDICAL LABORATORY

## 2023-08-01 PROCEDURE — 83036 HEMOGLOBIN A1C: ICD-10-PCS | Mod: ,,, | Performed by: CLINICAL MEDICAL LABORATORY

## 2023-08-01 RX ORDER — NIFEDIPINE 30 MG/1
30 TABLET, FILM COATED, EXTENDED RELEASE ORAL DAILY
Qty: 90 TABLET | Refills: 1 | Status: SHIPPED | OUTPATIENT
Start: 2023-08-01 | End: 2024-01-28

## 2023-08-01 RX ORDER — METFORMIN HYDROCHLORIDE 850 MG/1
850 TABLET ORAL 2 TIMES DAILY
Qty: 180 TABLET | Refills: 1 | Status: CANCELLED | OUTPATIENT
Start: 2023-08-01 | End: 2023-10-30

## 2023-08-01 NOTE — ASSESSMENT & PLAN NOTE
Patient currently on nifedipine 30 mg p.o. q.d..  She states this medication is extremely expensive and not covered by her insurance.  I will request records for from Dr. Jolley at [facility] [place] [facility] the [facility] to determine if this is for pulmonary hypertension versus essential hypertension.

## 2023-08-01 NOTE — PROGRESS NOTES
"              Westley Lim IV, DO  [facility] [place]  [address], [place] [address]  Phone: [phone]      Subjective     Name: Frannie Wetzel   Sex: female  YOB: 1945 (78 y.o.)  MRN: [id number]  Visit Date: 08/01/2023   Chief Complaint: Diabetes and Medication Refill (Routine office visit for med refills)        HISTORY OF PRESENT ILLNESS:    Chief Complaint   Patient presents with    Diabetes    Medication Refill     Routine office visit for med refills       HPI  See tests that keep you healthy and the problem oriented documentation below.    Tests to Keep You Healthy    Eye Exam: DUE  Last Blood Pressure <= 139/89 (8/1/2023): Yes      Portions of this note may have been created with voice recognition software. Occasional "wrong-word" or "sound-a-like" substitutions may have occurred due to the inherent limitations of voice recognition software. Please, read the note carefully and recognize, using context, where substitutions have occurred.     PAST MEDICAL HISTORY:  Significant Diagnoses - Patient  has a past medical history of Diabetes mellitus, type 2, Hyperlipidemia, and Hypothyroidism.  Medications - Patient has a current medication list which includes the following long-term medication(s): chlorthalidone, gabapentin, gemfibrozil, latanoprost, levothyroxine, losartan, metformin, pantoprazole, pravastatin, and nifedipine.   Allergies - Patient has No Known Allergies.  Surgeries - Patient  has no past surgical history on file.  Family History - Patient family history is not on file.      SOCIAL HISTORY:  Tobacco - Patient  reports that she has never smoked. She has never used smokeless tobacco.   Alcohol - Patient  reports no history of alcohol use.   Recreational Drugs - Patient  has no history on file for drug use.       Review of Systems   All other systems reviewed and are negative.       Past Medical History:   Diagnosis Date    Diabetes mellitus, type 2     " "Hyperlipidemia     Hypothyroidism         Review of patient's allergies indicates:  No Known Allergies     No past surgical history on file.     No family history on file.    Current Outpatient Medications   Medication Instructions    allopurinoL (ZYLOPRIM) 300 mg, Oral, Daily    chlorthalidone (HYGROTEN) 25 mg, Oral, Every morning    FeroSuL 325 mg, Oral, Every other day    fluticasone propionate (FLONASE) 50 mcg/actuation nasal spray SMARTSI Puff(s) Both Nares Daily    gabapentin (NEURONTIN) 300 MG capsule 1 capsule    gemfibroziL (LOPID) 600 mg, Oral, Daily    latanoprost 0.005 % ophthalmic solution 1 drop, Both Eyes, Nightly    levothyroxine (SYNTHROID) 25 mcg, Oral, Before breakfast    losartan (COZAAR) 100 mg, Oral, Daily    metFORMIN (GLUCOPHAGE) 850 mg, Oral, 2 times daily    NIFEdipine (ADALAT CC) 30 mg, Oral, Daily    pantoprazole (PROTONIX) 40 mg, Oral, Daily    pravastatin (PRAVACHOL) 40 mg, Oral, Daily        Objective     BP (!) 145/66   Pulse 75   Ht 5' 3" (1.6 m)   Wt 72 kg (158 lb 12.8 oz)   BMI 28.13 kg/m²     Physical Exam  Constitutional:       General: She is not in acute distress.     Appearance: Normal appearance. She is not ill-appearing.   HENT:      Head: Normocephalic and atraumatic.      Right Ear: External ear normal.      Left Ear: External ear normal.   Eyes:      Extraocular Movements: Extraocular movements intact.      Conjunctiva/sclera: Conjunctivae normal.   Cardiovascular:      Rate and Rhythm: Normal rate.      Heart sounds: No murmur heard.  Pulmonary:      Effort: Pulmonary effort is normal.   Musculoskeletal:      Cervical back: Normal range of motion.   Skin:     General: Skin is warm and dry.      Coloration: Skin is not jaundiced.      Findings: No rash.   Neurological:      Mental Status: She is alert.   Psychiatric:         Mood and Affect: Mood normal.        All recently obtained labs have been reviewed and discussed in detail with the patient. "   Assessment     1. Chronic kidney disease, stage 3a    2. Hyperlipidemia, unspecified hyperlipidemia type    3. Essential hypertension    4. Type 2 diabetes mellitus without complication, without long-term current use of insulin    5. Dysuria    6. Hyperparathyroidism         Plan        Problem List Items Addressed This Visit          Cardiac/Vascular    Essential hypertension     Patient currently on nifedipine 30 mg p.o. q.d..  She states this medication is extremely expensive and not covered by her insurance.  I will request records for from Dr. Jolley at [facility] [place] [facility] the [facility] to determine if this is for pulmonary hypertension versus essential hypertension.         Relevant Medications    NIFEdipine (ADALAT CC) 30 MG TbSR    Other Relevant Orders    Basic Metabolic Panel    Hyperlipidemia       Renal/    Chronic kidney disease, stage 3a - Primary       Endocrine    Type 2 diabetes mellitus without complication, without long-term current use of insulin     Hemoglobin A1C most recently controlled we will get urine screening for microalbuminuria as well as hemoglobin A1c today    Hemoglobin A1C   Date Value Ref Range Status   02/01/2023 6.1 4.5 - 6.6 % Final     Comment:       Normal:               <5.7%  Pre-Diabetic:       5.7% to 6.4%  Diabetic:             >6.4%  Diabetic Goal:     <7%   03/28/2022 6.4 4.5 - 6.6 % Final     Comment:       Normal:               <5.7%  Pre-Diabetic:       5.7% to 6.4%  Diabetic:             >6.4%  Diabetic Goal:     <7%   12/09/2021 6.3 4.5 - 6.6 % Final     Comment:       Normal:               <5.7%  Pre-Diabetic:       5.7% to 6.4%  Diabetic:             >6.4%  Diabetic Goal:     <7%              Relevant Orders    Hemoglobin A1C    Microalbumin/Creatinine Ratio, Urine    Hyperparathyroidism     Other Visit Diagnoses       Dysuria        Relevant Orders    POCT URINALYSIS W/O SCOPE            No follow-ups on file.    Patient advised that is symptoms worsen,  they should call or report directly to local emergency department.    Westley Lim,   [facility]

## 2023-08-01 NOTE — ASSESSMENT & PLAN NOTE
Hemoglobin A1C most recently controlled we will get urine screening for microalbuminuria as well as hemoglobin A1c today    Hemoglobin A1C   Date Value Ref Range Status   02/01/2023 6.1 4.5 - 6.6 % Final     Comment:       Normal:               <5.7%  Pre-Diabetic:       5.7% to 6.4%  Diabetic:             >6.4%  Diabetic Goal:     <7%   03/28/2022 6.4 4.5 - 6.6 % Final     Comment:       Normal:               <5.7%  Pre-Diabetic:       5.7% to 6.4%  Diabetic:             >6.4%  Diabetic Goal:     <7%   12/09/2021 6.3 4.5 - 6.6 % Final     Comment:       Normal:               <5.7%  Pre-Diabetic:       5.7% to 6.4%  Diabetic:             >6.4%  Diabetic Goal:     <7%

## 2023-08-01 NOTE — LETTER
AUTHORIZATION FOR RELEASE OF   CONFIDENTIAL INFORMATION    Dear Dr Jolley,    We are seeing Frannie Wetzel, date of birth 1945, in the clinic at [facility]. Westley Lim IV, DO is the patient's PCP. Frannie Wetzel has an outstanding lab/procedure at the time we reviewed her chart. In order to help keep her health information updated, she has authorized us to request the following medical record(s):        (  )  MAMMOGRAM                                      (  )  COLONOSCOPY      (  )  PAP SMEAR                                          (  )  OUTSIDE LAB RESULTS     (  )  DEXA SCAN                                          (  )  EYE EXAM            (  )  FOOT EXAM                                          (  )  ENTIRE RECORD     (  )  OUTSIDE IMMUNIZATIONS                 ( x )  Echocardiogram         Please fax records to Westley Lim IV, DO, [phone]     If you have any questions, please contact Verito Payton LPN at [phone].           Patient Name: Frannie Wetzel  : 1945  Patient Phone #: [phone]

## 2023-08-02 ENCOUNTER — PATIENT OUTREACH (OUTPATIENT)
Dept: ADMINISTRATIVE | Facility: HOSPITAL | Age: 78
End: 2023-08-02

## 2023-08-02 NOTE — PROGRESS NOTES
[facility]      PATIENT NAME: Frannie Wetzel   : 1945    AGE: 78 y.o. DATE: 2023   MRN: [id number]        Reason for Visit / Chief Complaint: Medicare AWV (Subsequent Medicare AWV . )        Frannie Wetzel presents for a Subsequent Medicare AWV today.     The following components were reviewed and updated:    Medical/Social/Family History:  Past Medical History:   Diagnosis Date    Diabetes mellitus, type 2     Hyperlipidemia     Hypothyroidism         Family History   Problem Relation Age of Onset    Diabetes Mother     Hypertension Mother     Heart disease Mother     Alzheimer's disease Mother     Heart disease Father         Past Surgical History:   Procedure Laterality Date    ADENOIDECTOMY      CHOLECYSTECTOMY      TONSILLECTOMY         Social History     Tobacco Use   Smoking Status Never   Smokeless Tobacco Never       Social History     Substance and Sexual Activity   Alcohol Use Never         Allergies and Current Medications     Review of patient's allergies indicates:   Allergen Reactions    Amoxapine Rash    Ropinirole Nausea And Vomiting       Current Outpatient Medications:     allopurinoL (ZYLOPRIM) 300 MG tablet, Take 1 tablet (300 mg total) by mouth once daily., Disp: 90 tablet, Rfl: 1    chlorthalidone (HYGROTEN) 25 MG Tab, Take 1 tablet (25 mg total) by mouth every morning., Disp: 30 tablet, Rfl: 2    FEROSUL 325 mg (65 mg iron) Tab tablet, Take 325 mg by mouth every other day., Disp: , Rfl:     fluticasone propionate (FLONASE) 50 mcg/actuation nasal spray, SMARTSI Puff(s) Both Nares Daily, Disp: , Rfl:     gabapentin (NEURONTIN) 300 MG capsule, 1 capsule., Disp: , Rfl:     gemfibroziL (LOPID) 600 MG tablet, Take 1 tablet (600 mg total) by mouth once daily., Disp: 90 tablet, Rfl: 1    latanoprost 0.005 % ophthalmic solution, Place 1 drop into both eyes every evening., Disp: , Rfl:     levothyroxine (SYNTHROID) 25 MCG tablet, Take 1 tablet (25 mcg  total) by mouth before breakfast., Disp: 90 tablet, Rfl: 0    losartan (COZAAR) 100 MG tablet, Take 1 tablet (100 mg total) by mouth once daily., Disp: 90 tablet, Rfl: 1    metFORMIN (GLUCOPHAGE) 850 MG tablet, Take 850 mg by mouth 2 (two) times daily., Disp: , Rfl:     NIFEdipine (ADALAT CC) 30 MG TbSR, Take 1 tablet (30 mg total) by mouth once daily., Disp: 90 tablet, Rfl: 1    pantoprazole (PROTONIX) 40 MG tablet, Take 1 tablet (40 mg total) by mouth once daily., Disp: 90 tablet, Rfl: 3    pravastatin (PRAVACHOL) 40 MG tablet, Take 1 tablet (40 mg total) by mouth once daily., Disp: 90 tablet, Rfl: 1      Health Risk Assessment   Fall Risk: yes   Obesity: BMI Body mass index is 27.86 kg/m².   Advance Directive: 0Does not have an advanced directive.  Verbal information given and written information provided on today's AVS.   Depression: PHQ9- 0   HTN: DASH diet, exercise, weight management, med compliance, home BP monitoring, and follow-up discussed.   T2DM: diabetic diet, glucose monitoring, activity level, weight management, med compliance, and follow-up discussed.  STI: not high risk   Statin Use: yes      Health Maintenance   Last eye exam: first of the year   Last CV screen with lipids: 02/01/2023   Diabetes screening with fasting glucose or A1c: 08/01/2023   Last pap/pelvic: advanced age   Last Mammogram: 01/10/2022  DEXA: waiting a appointment             Colonoscopy: states had last at [facility] will request report,declines referral   Flu Vaccine: out of season   Pneumonia vaccines: 13- 01/04/2017 23- 11/19/2019 20- 07/27/2022    COVID vaccine: 01/29/2021 03/18/2022    Hep B vaccine: NA  AAA screening: NA   HIV Screening: not high risk  Hepatitis C Screen: available  Low Dose CT Scan: NA    Health Maintenance Topics with due status: Not Due       Topic Last Completion Date    Influenza Vaccine 10/13/2020    Lipid Panel 02/01/2023    Diabetes Urine Screening 08/01/2023    Hemoglobin A1c 08/01/2023     Health  Maintenance Due   Topic Date Due    Hepatitis C Screening  Never done    Eye Exam  Never done    TETANUS VACCINE  Never done    DEXA Scan  Never done    Shingles Vaccine (1 of 2) Never done    COVID-19 Vaccine (2 - Moderna series) 05/13/2022         Lab results available in Epic or see dates from [facility] above:   Lab Results   Component Value Date    CHOL 163 02/01/2023    CHOL 150 03/28/2022    CHOL 127 12/09/2021     Lab Results   Component Value Date    HDL 35 (L) 02/01/2023    HDL 33 (L) 03/28/2022    HDL 31 (L) 12/09/2021     Lab Results   Component Value Date    LDLCALC 70 02/01/2023    LDLCALC 48 03/28/2022    LDLCALC 41 12/09/2021     Lab Results   Component Value Date    TRIG 288 (H) 02/01/2023    TRIG 343 (H) 03/28/2022    TRIG 277 (H) 12/09/2021         Lab Results   Component Value Date    HGBA1C 8.1 (H) 08/01/2023       Sodium   Date Value Ref Range Status   08/01/2023 139 136 - 145 mmol/L Final     Potassium   Date Value Ref Range Status   08/01/2023 4.1 3.5 - 5.1 mmol/L Final     Chloride   Date Value Ref Range Status   08/01/2023 107 98 - 107 mmol/L Final     CO2   Date Value Ref Range Status   08/01/2023 26 21 - 32 mmol/L Final     Glucose   Date Value Ref Range Status   08/01/2023 173 (H) 74 - 106 mg/dL Final     BUN   Date Value Ref Range Status   08/01/2023 23 (H) 7 - 18 mg/dL Final     Creatinine   Date Value Ref Range Status   08/01/2023 1.16 (H) 0.55 - 1.02 mg/dL Final     Calcium   Date Value Ref Range Status   08/01/2023 10.5 (H) 8.5 - 10.1 mg/dL Final     Total Protein   Date Value Ref Range Status   02/01/2023 6.9 6.4 - 8.2 g/dL Final     Albumin   Date Value Ref Range Status   02/01/2023 3.5 3.5 - 5.0 g/dL Final     Bilirubin, Total   Date Value Ref Range Status   02/01/2023 0.4 >0.0 - 1.2 mg/dL Final     Alk Phos   Date Value Ref Range Status   02/01/2023 110 55 - 142 U/L Final     AST   Date Value Ref Range Status   02/01/2023 11 (L) 15 - 37 U/L Final     ALT   Date Value Ref Range Status  "  02/01/2023 19 13 - 56 U/L Final     Anion Gap   Date Value Ref Range Status   08/01/2023 10 7 - 16 mmol/L Final     eGFR   Date Value Ref Range Status   03/28/2022 39 (L) >=60 mL/min/1.73m² Final       Incontinence  Bowel: no  Bladder: no      Care Team:  Dr. Jolley -Cardiology                  Dr. Aguilar- Optometry       **See Completed Assessments for Annual Wellness visit within the encounter summary    The following assessments were completed & reviewed:  Depression Screening  Cognitive function Screening  Timed Get Up Test  Whisper Test  Vision Screen  Health Risk Assessment  Checklist of ADLs and IADLs  Opioid Risk Assessment            Objective  Vitals:    08/03/23 0918   BP: 139/77   Pulse: 73   Resp: 16   Temp: 97 °F (36.1 °C)   SpO2: 98%   Weight: 71.4 kg (157 lb 4.8 oz)   Height: 5' 3" (1.6 m)   PainSc: 0-No pain      Body mass index is 27.86 kg/m².  Ideal body weight: 52.4 kg (115 lb 8.3 oz)       Physical Exam  Vitals reviewed.   Constitutional:       General: She is not in acute distress.     Appearance: She is not ill-appearing or toxic-appearing.   HENT:      Head: Normocephalic and atraumatic.      Nose: Nose normal.   Eyes:      General: No scleral icterus.     Extraocular Movements: Extraocular movements intact.   Pulmonary:      Effort: Pulmonary effort is normal.   Musculoskeletal:      Cervical back: No rigidity or tenderness.   Skin:     Findings: No rash.       Assessment:     1. Encounter for subsequent annual wellness visit (AWV) in Medicare patient    2. Essential hypertension    3. Hyperlipidemia, unspecified hyperlipidemia type    4. Hyperparathyroidism    5. BMI 27.0-27.9,adult       Problem List Items Addressed This Visit          Cardiac/Vascular    Essential hypertension    Hyperlipidemia       Endocrine    Hyperparathyroidism     Other Visit Diagnoses       Encounter for subsequent annual wellness visit (AWV) in Medicare patient    -  Primary    BMI 27.0-27.9,adult          "       Plan:    Encounter for subsequent annual wellness visit (AWV) in Medicare patient    Essential hypertension    Hyperlipidemia, unspecified hyperlipidemia type    Hyperparathyroidism    BMI 27.0-27.9,adult        Referrals:   bone mineral density scan.  Will also be starting patient on Janumet for an A1c of 8.1.    Advised to call office if does not hear from anyone with referral appt within 2-3 weeks to check on status of referral. Voiced understanding.      Discussed and provided with a screening schedule and personal prevention plan in accordance with USPSTF age appropriate recommendations and Medicare screening guidelines.   Education, counseling, and referrals were provided as needed.  After Visit Summary printed and given to patient which includes written education and a list of any referrals if indicated.     Education including diet, exercise, falls and advanced directives discussed with patient and patient verbalized understanding.      F/u plan for yearly AWV.    Signature: Westley Lim IV, DO      I offered to discuss advanced care planning, including how to pick a person who would make decisions for you if you were unable to make them for yourself, called a health care power of , and what kind of decisions you might make such as use of life sustaining treatments such as ventilators and tube feeding when faced with a life limiting illness recorded on a living will that they will need to know. (How you want to be cared for as you near the end of your natural life)     X Patient is interested in learning more about how to make advanced directives.  I provided them paperwork and offered to discuss this with them.

## 2023-08-02 NOTE — LETTER
AUTHORIZATION FOR RELEASE OF   CONFIDENTIAL INFORMATION    Dear ,    We are seeing Frannie Wetzel, date of birth 1945, in the clinic at [facility]. Westley Lim IV, DO is the patient's PCP. Frannie Wetzel has an outstanding lab/procedure at the time we reviewed her chart. In order to help keep her health information updated, she has authorized us to request the following medical record(s):        (  )  MAMMOGRAM                                      (  )  COLONOSCOPY      (  )  PAP SMEAR                                          (  )  OUTSIDE LAB RESULTS     (  )  DEXA SCAN                                          ( xx )  EYE EXAM            (  )  FOOT EXAM                                          (  )  ENTIRE RECORD     (  )  OUTSIDE IMMUNIZATIONS                 (  )  _______________         Please fax records to [facility], William L Brown IV, DO, [phone]     If you have any questions, please contact [place] at [phone].           Patient Name: Frannie Wetzel  : 1945  Patient Phone #: [phone]

## 2023-08-03 ENCOUNTER — OFFICE VISIT (OUTPATIENT)
Dept: FAMILY MEDICINE | Facility: CLINIC | Age: 78
End: 2023-08-03
Payer: MEDICARE

## 2023-08-03 VITALS
WEIGHT: 157.31 LBS | DIASTOLIC BLOOD PRESSURE: 77 MMHG | BODY MASS INDEX: 27.87 KG/M2 | RESPIRATION RATE: 16 BRPM | SYSTOLIC BLOOD PRESSURE: 139 MMHG | HEIGHT: 63 IN | HEART RATE: 73 BPM | OXYGEN SATURATION: 98 % | TEMPERATURE: 97 F

## 2023-08-03 DIAGNOSIS — Z78.0 ASYMPTOMATIC POSTMENOPAUSAL STATE: ICD-10-CM

## 2023-08-03 DIAGNOSIS — E21.3 HYPERPARATHYROIDISM: ICD-10-CM

## 2023-08-03 DIAGNOSIS — Z00.00 ENCOUNTER FOR SUBSEQUENT ANNUAL WELLNESS VISIT (AWV) IN MEDICARE PATIENT: Primary | ICD-10-CM

## 2023-08-03 DIAGNOSIS — Z00.00 ENCOUNTER FOR PREVENTIVE HEALTH EXAMINATION: ICD-10-CM

## 2023-08-03 DIAGNOSIS — E11.22 TYPE 2 DIABETES MELLITUS WITH STAGE 3A CHRONIC KIDNEY DISEASE, WITHOUT LONG-TERM CURRENT USE OF INSULIN: ICD-10-CM

## 2023-08-03 DIAGNOSIS — N18.31 TYPE 2 DIABETES MELLITUS WITH STAGE 3A CHRONIC KIDNEY DISEASE, WITHOUT LONG-TERM CURRENT USE OF INSULIN: ICD-10-CM

## 2023-08-03 DIAGNOSIS — I10 ESSENTIAL HYPERTENSION: ICD-10-CM

## 2023-08-03 DIAGNOSIS — E78.5 HYPERLIPIDEMIA, UNSPECIFIED HYPERLIPIDEMIA TYPE: ICD-10-CM

## 2023-08-03 PROCEDURE — G0439 PPPS, SUBSEQ VISIT: HCPCS | Mod: ,,, | Performed by: FAMILY MEDICINE

## 2023-08-03 PROCEDURE — G0439 PR MEDICARE ANNUAL WELLNESS SUBSEQUENT VISIT: ICD-10-PCS | Mod: ,,, | Performed by: FAMILY MEDICINE

## 2023-08-03 RX ORDER — SITAGLIPTIN AND METFORMIN HYDROCHLORIDE 1000; 100 MG/1; MG/1
1 TABLET, FILM COATED, EXTENDED RELEASE ORAL DAILY
Qty: 90 TABLET | Refills: 3 | Status: SHIPPED | OUTPATIENT
Start: 2023-08-03 | End: 2023-08-28

## 2023-08-03 NOTE — PATIENT INSTRUCTIONS
Counseling and Referral of Other Preventative  (Italic type indicates deductible and co-insurance are waived)    Patient Name: Frannie Wetzel  Today's Date: 8/3/2023    Health Maintenance       Date Due Completion Date    Hepatitis C Screening Never done ---    Eye Exam Never done ---    TETANUS VACCINE Never done ---    DEXA Scan Never done ---    Shingles Vaccine (1 of 2) Never done ---    COVID-19 Vaccine (2 - Moderna series) 05/13/2022 3/18/2022    Influenza Vaccine (1) 09/01/2023 10/13/2020    Hemoglobin A1c 11/01/2023 8/1/2023    Lipid Panel 02/01/2024 2/1/2023    Diabetes Urine Screening 08/01/2024 8/1/2023        No orders of the defined types were placed in this encounter.    The following information is provided to all patients.  This information is to help you find resources for any of the problems found today that may be affecting your health:                Living healthy guide: www.[facility].[place].[facility]      Understanding Diabetes: www.diabetes.org      Eating healthy: www.cdc.gov/healthyweight      CDC home safety checklist: www.cdc.gov/steadi/patient.html      Agency on Aging: www.goea.[place].gov      Alcoholics anonymous (AA): www.aa.org      Physical Activity: www.reji.nih.gov/cz6tnbz      Tobacco use: www.quitwithusla.org

## 2023-08-11 ENCOUNTER — PATIENT OUTREACH (OUTPATIENT)
Dept: ADMINISTRATIVE | Facility: HOSPITAL | Age: 78
End: 2023-08-11

## 2023-08-17 ENCOUNTER — HOSPITAL ENCOUNTER (OUTPATIENT)
Dept: RADIOLOGY | Facility: HOSPITAL | Age: 78
Discharge: HOME | End: 2023-08-17
Attending: FAMILY MEDICINE
Payer: MEDICARE

## 2023-08-17 DIAGNOSIS — Z78.0 ASYMPTOMATIC POSTMENOPAUSAL STATE: ICD-10-CM

## 2023-08-17 PROCEDURE — 77080 DXA BONE DENSITY AXIAL: CPT | Mod: 26,,, | Performed by: RADIOLOGY

## 2023-08-17 PROCEDURE — 77080 DXA BONE DENSITY AXIAL: CPT | Mod: TC

## 2023-08-17 PROCEDURE — 77080 DXA BONE DENSITY AXIAL SKELETON 1 OR MORE SITES: ICD-10-PCS | Mod: 26,,, | Performed by: RADIOLOGY

## 2023-08-24 DIAGNOSIS — N39.0 URINARY TRACT INFECTION WITHOUT HEMATURIA, SITE UNSPECIFIED: Primary | ICD-10-CM

## 2023-08-24 RX ORDER — NITROFURANTOIN 25; 75 MG/1; MG/1
100 CAPSULE ORAL 2 TIMES DAILY
Qty: 6 CAPSULE | Refills: 0 | Status: SHIPPED | OUTPATIENT
Start: 2023-08-24 | End: 2023-08-27

## 2023-08-28 DIAGNOSIS — N18.31 TYPE 2 DIABETES MELLITUS WITH STAGE 3A CHRONIC KIDNEY DISEASE, WITHOUT LONG-TERM CURRENT USE OF INSULIN: Primary | ICD-10-CM

## 2023-08-28 DIAGNOSIS — E11.42 POLYNEUROPATHY DUE TO TYPE 2 DIABETES MELLITUS: ICD-10-CM

## 2023-08-28 DIAGNOSIS — E11.22 TYPE 2 DIABETES MELLITUS WITH STAGE 3A CHRONIC KIDNEY DISEASE, WITHOUT LONG-TERM CURRENT USE OF INSULIN: Primary | ICD-10-CM

## 2023-08-28 RX ORDER — GABAPENTIN 300 MG/1
300 CAPSULE ORAL 3 TIMES DAILY
Qty: 270 CAPSULE | Refills: 0 | Status: SHIPPED | OUTPATIENT
Start: 2023-08-28 | End: 2023-11-09 | Stop reason: SDUPTHER

## 2023-08-28 RX ORDER — METFORMIN HYDROCHLORIDE 850 MG/1
850 TABLET ORAL 2 TIMES DAILY WITH MEALS
Qty: 180 TABLET | Refills: 3 | Status: CANCELLED | OUTPATIENT
Start: 2023-08-28 | End: 2024-08-27

## 2023-08-28 RX ORDER — METFORMIN HYDROCHLORIDE 500 MG/1
500 TABLET, EXTENDED RELEASE ORAL
Qty: 90 TABLET | Refills: 3 | Status: SHIPPED | OUTPATIENT
Start: 2023-08-28 | End: 2023-10-24 | Stop reason: SDUPTHER

## 2023-08-28 NOTE — TELEPHONE ENCOUNTER
----- Message from Shabana Lopez sent at 8/28/2023  9:42 AM CDT -----  Please call patient [phone]

## 2023-08-29 ENCOUNTER — OFFICE VISIT (OUTPATIENT)
Dept: FAMILY MEDICINE | Facility: CLINIC | Age: 78
End: 2023-08-29
Payer: MEDICARE

## 2023-08-29 VITALS
HEIGHT: 63 IN | DIASTOLIC BLOOD PRESSURE: 76 MMHG | BODY MASS INDEX: 28 KG/M2 | HEART RATE: 80 BPM | SYSTOLIC BLOOD PRESSURE: 144 MMHG | WEIGHT: 158 LBS

## 2023-08-29 DIAGNOSIS — N39.0 URINARY TRACT INFECTION WITHOUT HEMATURIA, SITE UNSPECIFIED: ICD-10-CM

## 2023-08-29 DIAGNOSIS — E11.22 TYPE 2 DIABETES MELLITUS WITH STAGE 3A CHRONIC KIDNEY DISEASE, WITHOUT LONG-TERM CURRENT USE OF INSULIN: Primary | ICD-10-CM

## 2023-08-29 DIAGNOSIS — N18.31 TYPE 2 DIABETES MELLITUS WITH STAGE 3A CHRONIC KIDNEY DISEASE, WITHOUT LONG-TERM CURRENT USE OF INSULIN: Primary | ICD-10-CM

## 2023-08-29 DIAGNOSIS — I10 ESSENTIAL HYPERTENSION: ICD-10-CM

## 2023-08-29 LAB
BACTERIA #/AREA URNS HPF: ABNORMAL /HPF
BILIRUB UR QL STRIP: NEGATIVE
CLARITY UR: ABNORMAL
COLOR UR: YELLOW
GLUCOSE UR STRIP-MCNC: 200 MG/DL
KETONES UR STRIP-SCNC: NEGATIVE MG/DL
LEUKOCYTE ESTERASE UR QL STRIP: ABNORMAL
NITRITE UR QL STRIP: POSITIVE
PH UR STRIP: 5.5 PH UNITS
PROT UR QL STRIP: 100
RBC # UR STRIP: ABNORMAL /UL
RBC #/AREA URNS HPF: 49 /HPF
SP GR UR STRIP: 1.01
UROBILINOGEN UR STRIP-ACNC: NORMAL MG/DL
WBC #/AREA URNS HPF: >182 /HPF
WBC CLUMPS, UA: ABNORMAL /HPF

## 2023-08-29 PROCEDURE — 87086 URINE CULTURE/COLONY COUNT: CPT | Mod: ,,, | Performed by: CLINICAL MEDICAL LABORATORY

## 2023-08-29 PROCEDURE — 87077 CULTURE, URINE: ICD-10-PCS | Mod: ,,, | Performed by: CLINICAL MEDICAL LABORATORY

## 2023-08-29 PROCEDURE — 99214 PR OFFICE/OUTPT VISIT, EST, LEVL IV, 30-39 MIN: ICD-10-PCS | Mod: ,,, | Performed by: FAMILY MEDICINE

## 2023-08-29 PROCEDURE — 87077 CULTURE AEROBIC IDENTIFY: CPT | Mod: ,,, | Performed by: CLINICAL MEDICAL LABORATORY

## 2023-08-29 PROCEDURE — 87086 CULTURE, URINE: ICD-10-PCS | Mod: ,,, | Performed by: CLINICAL MEDICAL LABORATORY

## 2023-08-29 PROCEDURE — 81001 URINALYSIS, REFLEX TO URINE CULTURE: ICD-10-PCS | Mod: ,,, | Performed by: CLINICAL MEDICAL LABORATORY

## 2023-08-29 PROCEDURE — 87186 SC STD MICRODIL/AGAR DIL: CPT | Mod: ,,, | Performed by: CLINICAL MEDICAL LABORATORY

## 2023-08-29 PROCEDURE — 99214 OFFICE O/P EST MOD 30 MIN: CPT | Mod: ,,, | Performed by: FAMILY MEDICINE

## 2023-08-29 PROCEDURE — 87186 CULTURE, URINE: ICD-10-PCS | Mod: ,,, | Performed by: CLINICAL MEDICAL LABORATORY

## 2023-08-29 PROCEDURE — 81001 URINALYSIS AUTO W/SCOPE: CPT | Mod: ,,, | Performed by: CLINICAL MEDICAL LABORATORY

## 2023-08-29 RX ORDER — GLIMEPIRIDE 1 MG/1
0.5 TABLET ORAL
Qty: 45 TABLET | Refills: 3 | Status: SHIPPED | OUTPATIENT
Start: 2023-08-29 | End: 2024-08-28

## 2023-08-29 NOTE — PROGRESS NOTES
"              Westley Lim IV, DO  [facility]  [address] [place] [address]  Phone: [phone]      Subjective     Name: Frannie Wetzel   Sex: female  YOB: 1945 (78 y.o.)  MRN: [id number]  Visit Date: 08/29/2023   Chief Complaint: Diabetes (Discuss medication for diabetes) and Urinary Tract Infection (Recent treatment for uti not resolved)        HISTORY OF PRESENT ILLNESS:    Chief Complaint   Patient presents with    Diabetes     Discuss medication for diabetes    Urinary Tract Infection     Recent treatment for uti not resolved       HPI  See tests that keep you healthy and the problem oriented documentation below.    Tests to Keep You Healthy    Eye Exam: Met on 1/10/2023  Last Blood Pressure <= 139/89 (8/29/2023): NO      Portions of this note may have been created with voice recognition software. Occasional "wrong-word" or "sound-a-like" substitutions may have occurred due to the inherent limitations of voice recognition software. Please, read the note carefully and recognize, using context, where substitutions have occurred.     PAST MEDICAL HISTORY:  Significant Diagnoses - Patient  has a past medical history of Diabetes mellitus, type 2, Hyperlipidemia, and Hypothyroidism.  Medications - Patient has a current medication list which includes the following long-term medication(s): chlorthalidone, gabapentin, gemfibrozil, latanoprost, levothyroxine, losartan, metformin, nifedipine, pantoprazole, pravastatin, and glimepiride.   Allergies - Patient is allergic to amoxapine and ropinirole.  Surgeries - Patient  has a past surgical history that includes Cholecystectomy; Tonsillectomy; and Adenoidectomy.  Family History - Patient family history includes Alzheimer's disease in her mother; Diabetes in her mother; Heart disease in her father and mother; Hypertension in her mother.      SOCIAL HISTORY:  Tobacco - Patient  reports that she has never smoked. She has never " "used smokeless tobacco.   Alcohol - Patient  reports no history of alcohol use.   Recreational Drugs - Patient  reports no history of drug use.       Review of Systems   All other systems reviewed and are negative.       Past Medical History:   Diagnosis Date    Diabetes mellitus, type 2     Hyperlipidemia     Hypothyroidism         Review of patient's allergies indicates:   Allergen Reactions    Amoxapine Rash    Ropinirole Nausea And Vomiting        Past Surgical History:   Procedure Laterality Date    ADENOIDECTOMY      CHOLECYSTECTOMY      TONSILLECTOMY          Family History   Problem Relation Age of Onset    Diabetes Mother     Hypertension Mother     Heart disease Mother     Alzheimer's disease Mother     Heart disease Father        Current Outpatient Medications   Medication Instructions    allopurinoL (ZYLOPRIM) 300 mg, Oral, Daily    chlorthalidone (HYGROTEN) 25 mg, Oral, Every morning    FeroSuL 325 mg, Oral, Every other day    fluticasone propionate (FLONASE) 50 mcg/actuation nasal spray SMARTSI Puff(s) Both Nares Daily    gabapentin (NEURONTIN) 300 mg, Oral, 3 times daily    gemfibroziL (LOPID) 600 mg, Oral, Daily    glimepiride (AMARYL) 0.5 mg, Oral, Before breakfast    latanoprost 0.005 % ophthalmic solution 1 drop, Both Eyes, Nightly    levothyroxine (SYNTHROID) 25 mcg, Oral, Before breakfast    losartan (COZAAR) 100 mg, Oral, Daily    metFORMIN (GLUCOPHAGE-XR) 500 mg, Oral, With breakfast    NIFEdipine (ADALAT CC) 30 mg, Oral, Daily    pantoprazole (PROTONIX) 40 mg, Oral, Daily    pravastatin (PRAVACHOL) 40 mg, Oral, Daily        Objective     BP (!) 144/76   Pulse 80   Ht 5' 3" (1.6 m)   Wt 71.7 kg (158 lb)   BMI 27.99 kg/m²     Physical Exam  Constitutional:       General: She is not in acute distress.     Appearance: Normal appearance. She is not ill-appearing.   HENT:      Head: Normocephalic and atraumatic.      Right Ear: External ear normal.      Left Ear: " External ear normal.   Eyes:      Extraocular Movements: Extraocular movements intact.      Conjunctiva/sclera: Conjunctivae normal.   Cardiovascular:      Rate and Rhythm: Normal rate.      Heart sounds: No murmur heard.  Pulmonary:      Effort: Pulmonary effort is normal.   Musculoskeletal:      Cervical back: Normal range of motion.   Skin:     General: Skin is warm and dry.      Coloration: Skin is not jaundiced.      Findings: No rash.   Neurological:      Mental Status: She is alert.   Psychiatric:         Mood and Affect: Mood normal.        All recently obtained labs have been reviewed and discussed in detail with the patient.   Assessment     1. Type 2 diabetes mellitus with stage 3a chronic kidney disease, without long-term current use of insulin    2. Urinary tract infection without hematuria, site unspecified    3. Essential hypertension         Plan        Problem List Items Addressed This Visit          Cardiac/Vascular    Essential hypertension     Elevated blood pressure reading in the office today of 144/76.  This most likely secondary to concerns about her health issues.  We will try remote blood pressure check on this patient.            Endocrine    Type 2 diabetes mellitus with stage 3a chronic kidney disease, without long-term current use of insulin - Primary     A patient's hemoglobin A1c is increased 8.1.  She is unable to avoid anything besides a sulfonylurea.  We will be reinstituting Amaryl at 0.5 mg p.o. q.d. and patient should return to the clinic in November         Relevant Medications    glimepiride (AMARYL) 1 MG tablet     Other Visit Diagnoses       Urinary tract infection without hematuria, site unspecified        Relevant Orders    Urinalysis, Reflex to Urine Culture            No follow-ups on file.    Patient advised that is symptoms worsen, they should call or report directly to local emergency department.    Westley Lim,   [facility]  [facility]

## 2023-08-29 NOTE — ASSESSMENT & PLAN NOTE
Elevated blood pressure reading in the office today of 144/76.  This most likely secondary to concerns about her health issues.  We will try remote blood pressure check on this patient.

## 2023-08-29 NOTE — ASSESSMENT & PLAN NOTE
A patient's hemoglobin A1c is increased 8.1.  She is unable to avoid anything besides a sulfonylurea.  We will be reinstituting Amaryl at 0.5 mg p.o. q.d. and patient should return to the clinic in November

## 2023-08-30 DIAGNOSIS — N39.0 URINARY TRACT INFECTION WITHOUT HEMATURIA, SITE UNSPECIFIED: Primary | ICD-10-CM

## 2023-08-30 RX ORDER — SULFAMETHOXAZOLE AND TRIMETHOPRIM 800; 160 MG/1; MG/1
1 TABLET ORAL 2 TIMES DAILY
Qty: 10 TABLET | Refills: 0 | Status: SHIPPED | OUTPATIENT
Start: 2023-08-30 | End: 2023-09-04

## 2023-08-31 LAB — UA COMPLETE W REFLEX CULTURE PNL UR: ABNORMAL

## 2023-09-09 DIAGNOSIS — Z71.89 COMPLEX CARE COORDINATION: ICD-10-CM

## 2023-09-22 ENCOUNTER — OFFICE VISIT (OUTPATIENT)
Dept: FAMILY MEDICINE | Facility: CLINIC | Age: 78
End: 2023-09-22
Payer: MEDICARE

## 2023-09-22 VITALS
OXYGEN SATURATION: 98 % | BODY MASS INDEX: 28.42 KG/M2 | TEMPERATURE: 97 F | WEIGHT: 160.38 LBS | DIASTOLIC BLOOD PRESSURE: 76 MMHG | SYSTOLIC BLOOD PRESSURE: 151 MMHG | HEIGHT: 63 IN

## 2023-09-22 DIAGNOSIS — R35.0 URINE FREQUENCY: ICD-10-CM

## 2023-09-22 DIAGNOSIS — R31.9 URINARY TRACT INFECTION WITH HEMATURIA, SITE UNSPECIFIED: Primary | ICD-10-CM

## 2023-09-22 DIAGNOSIS — R30.0 BURNING WITH URINATION: ICD-10-CM

## 2023-09-22 DIAGNOSIS — N39.0 URINARY TRACT INFECTION WITH HEMATURIA, SITE UNSPECIFIED: Primary | ICD-10-CM

## 2023-09-22 LAB
BILIRUB SERPL-MCNC: NEGATIVE MG/DL
BLOOD URINE, POC: ABNORMAL
COLOR, POC UA: YELLOW
GLUCOSE UR QL STRIP: NEGATIVE
KETONES UR QL STRIP: NEGATIVE
LEUKOCYTE ESTERASE URINE, POC: ABNORMAL
NITRITE, POC UA: POSITIVE
PH, POC UA: 6
PROTEIN, POC: ABNORMAL
SPECIFIC GRAVITY, POC UA: >=1.03
UROBILINOGEN, POC UA: ABNORMAL

## 2023-09-22 PROCEDURE — 87077 CULTURE AEROBIC IDENTIFY: CPT | Mod: ,,, | Performed by: CLINICAL MEDICAL LABORATORY

## 2023-09-22 PROCEDURE — 87186 SC STD MICRODIL/AGAR DIL: CPT | Mod: ,,, | Performed by: CLINICAL MEDICAL LABORATORY

## 2023-09-22 PROCEDURE — 99213 PR OFFICE/OUTPT VISIT, EST, LEVL III, 20-29 MIN: ICD-10-PCS | Mod: ,,, | Performed by: NURSE PRACTITIONER

## 2023-09-22 PROCEDURE — 87186 CULTURE, URINE: ICD-10-PCS | Mod: ,,, | Performed by: CLINICAL MEDICAL LABORATORY

## 2023-09-22 PROCEDURE — 87086 URINE CULTURE/COLONY COUNT: CPT | Mod: ,,, | Performed by: CLINICAL MEDICAL LABORATORY

## 2023-09-22 PROCEDURE — 99213 OFFICE O/P EST LOW 20 MIN: CPT | Mod: ,,, | Performed by: NURSE PRACTITIONER

## 2023-09-22 PROCEDURE — 81003 URINALYSIS AUTO W/O SCOPE: CPT | Mod: RHCUB | Performed by: NURSE PRACTITIONER

## 2023-09-22 PROCEDURE — 87077 CULTURE, URINE: ICD-10-PCS | Mod: ,,, | Performed by: CLINICAL MEDICAL LABORATORY

## 2023-09-22 PROCEDURE — 87086 CULTURE, URINE: ICD-10-PCS | Mod: ,,, | Performed by: CLINICAL MEDICAL LABORATORY

## 2023-09-22 RX ORDER — CEFUROXIME AXETIL 250 MG/1
250 TABLET ORAL 2 TIMES DAILY
Qty: 14 TABLET | Refills: 0 | Status: SHIPPED | OUTPATIENT
Start: 2023-09-22 | End: 2023-09-29

## 2023-09-22 RX ORDER — VALSARTAN 320 MG/1
320 TABLET ORAL DAILY
COMMUNITY
Start: 2023-09-12

## 2023-09-22 NOTE — PROGRESS NOTES
"Subjective:       Patient ID: Frannie Wetzel is a 78 y.o. female.    Chief Complaint: Urinary Tract Infection (Recurrent UTI. C/o of urine frequency. No fever. )    HPI    Patient presents c/o recurrent UTI  Recently treated for UTI  by PCP Dr. Lim with course of Bactrim, which according to urine culture, bacteria was sensitive to.   Patient here today c/o continued symptoms that have worsened since completing Bactrim  Describes dysuria, suprapubic pain, and urinary frequency    BP (!) 151/76   Temp 97.3 °F (36.3 °C)   Ht 5' 3" (1.6 m)   Wt 72.8 kg (160 lb 6.4 oz)   SpO2 98%   BMI 28.41 kg/m²     Medication List with Changes/Refills   New Medications    CEFUROXIME (CEFTIN) 250 MG TABLET    Take 1 tablet (250 mg total) by mouth 2 (two) times daily. for 7 days   Current Medications    ALLOPURINOL (ZYLOPRIM) 300 MG TABLET    Take 1 tablet (300 mg total) by mouth once daily.    CHLORTHALIDONE (HYGROTEN) 25 MG TAB    Take 1 tablet (25 mg total) by mouth every morning.    FEROSUL 325 MG (65 MG IRON) TAB TABLET    Take 325 mg by mouth every other day.    FLUTICASONE PROPIONATE (FLONASE) 50 MCG/ACTUATION NASAL SPRAY    SMARTSI Puff(s) Both Nares Daily    GABAPENTIN (NEURONTIN) 300 MG CAPSULE    Take 1 capsule (300 mg total) by mouth 3 (three) times daily.    GEMFIBROZIL (LOPID) 600 MG TABLET    Take 1 tablet (600 mg total) by mouth once daily.    GLIMEPIRIDE (AMARYL) 1 MG TABLET    Take 0.5 tablets (0.5 mg total) by mouth before breakfast.    LATANOPROST 0.005 % OPHTHALMIC SOLUTION    Place 1 drop into both eyes every evening.    LEVOTHYROXINE (SYNTHROID) 25 MCG TABLET    Take 1 tablet (25 mcg total) by mouth before breakfast.    LOSARTAN (COZAAR) 100 MG TABLET    Take 1 tablet (100 mg total) by mouth once daily.    METFORMIN (GLUCOPHAGE-XR) 500 MG ER 24HR TABLET    Take 1 tablet (500 mg total) by mouth daily with breakfast.    NIFEDIPINE (ADALAT CC) 30 MG TBSR    Take 1 tablet (30 mg total) by mouth once " daily.    PANTOPRAZOLE (PROTONIX) 40 MG TABLET    Take 1 tablet (40 mg total) by mouth once daily.    PRAVASTATIN (PRAVACHOL) 40 MG TABLET    Take 1 tablet (40 mg total) by mouth once daily.    VALSARTAN (DIOVAN) 320 MG TABLET    Take 320 mg by mouth once daily.       Review of Systems   Constitutional:  Negative for chills and fever.   Respiratory:  Negative for cough.    Cardiovascular:  Negative for chest pain.   Gastrointestinal:  Negative for nausea.   Genitourinary:  Positive for dysuria and frequency.         Objective:      Physical Exam  Vitals and nursing note reviewed.   Constitutional:       General: She is not in acute distress.     Appearance: Normal appearance.   HENT:      Head: Normocephalic.      Nose: Nose normal.      Mouth/Throat:      Mouth: Mucous membranes are moist.   Eyes:      Conjunctiva/sclera: Conjunctivae normal.   Neck:      Thyroid: No thyromegaly.      Trachea: Trachea normal.   Cardiovascular:      Rate and Rhythm: Normal rate and regular rhythm.      Pulses: Normal pulses.      Heart sounds: Normal heart sounds.   Pulmonary:      Effort: Pulmonary effort is normal. No respiratory distress.      Breath sounds: Normal breath sounds.   Musculoskeletal:      Cervical back: Neck supple.   Skin:     General: Skin is warm and dry.   Neurological:      General: No focal deficit present.      Mental Status: She is alert and oriented to person, place, and time.   Psychiatric:         Mood and Affect: Mood normal.         Behavior: Behavior normal.         Assessment:       1. Urinary tract infection with hematuria, site unspecified    2. Urine frequency    3. Burning with urination        Plan:       Patient Instructions   UA results reviewed    Antibiotic as prescribed     Will order urine culture also; Patient encouraged to follow up next week if urinary symptoms persist/worsen. Patient voiced understanding    While at clinic patient asked about recent bone density results; Results  reviewed with patient which revealed osteoporosis. Encouraged adequate Calcium and Vitamin D intake/supplement daily. She voices that she will be more careful with ambulation, as she had two falls this past year. Michaela PEARL will review results with Dr. Lim next week and call patient with any changes to plan of care.    Urinary tract infection with hematuria, site unspecified  -     Urine culture  -     cefUROXime (CEFTIN) 250 MG tablet; Take 1 tablet (250 mg total) by mouth 2 (two) times daily. for 7 days  Dispense: 14 tablet; Refill: 0    Urine frequency  -     POCT URINALYSIS W/O SCOPE    Burning with urination  -     POCT URINALYSIS W/O SCOPE

## 2023-09-22 NOTE — PATIENT INSTRUCTIONS
UA results reviewed    Antibiotic as prescribed     Will order urine culture also; Patient encouraged to follow up next week if urinary symptoms persist/worsen. Patient voiced understanding    While at clinic patient asked about recent bone density results; Results reviewed with patient which revealed osteoporosis. Encouraged adequate Calcium and Vitamin D intake/supplement daily. She voices that she will be more careful with ambulation, as she had two falls this past year. Michaela PEARL will review results with Dr. Lim next week and call patient with any changes to plan of care.

## 2023-09-24 LAB — UA COMPLETE W REFLEX CULTURE PNL UR: ABNORMAL

## 2023-10-02 ENCOUNTER — OFFICE VISIT (OUTPATIENT)
Dept: FAMILY MEDICINE | Facility: CLINIC | Age: 78
End: 2023-10-02
Payer: MEDICARE

## 2023-10-02 VITALS
WEIGHT: 157 LBS | SYSTOLIC BLOOD PRESSURE: 148 MMHG | OXYGEN SATURATION: 95 % | DIASTOLIC BLOOD PRESSURE: 79 MMHG | BODY MASS INDEX: 27.82 KG/M2 | HEIGHT: 63 IN | HEART RATE: 77 BPM

## 2023-10-02 DIAGNOSIS — U07.1 SARS-COV-2 POSITIVE: ICD-10-CM

## 2023-10-02 DIAGNOSIS — Z20.822 ENCOUNTER FOR LABORATORY TESTING FOR COVID-19 VIRUS: Primary | ICD-10-CM

## 2023-10-02 DIAGNOSIS — R05.1 ACUTE COUGH: ICD-10-CM

## 2023-10-02 LAB
CTP QC/QA: YES
FLUAV AG NPH QL: NEGATIVE
FLUBV AG NPH QL: NEGATIVE
SARS-COV-2 AG RESP QL IA.RAPID: POSITIVE

## 2023-10-02 PROCEDURE — 99214 OFFICE O/P EST MOD 30 MIN: CPT | Mod: ,,, | Performed by: FAMILY MEDICINE

## 2023-10-02 PROCEDURE — 87428 SARSCOV & INF VIR A&B AG IA: CPT | Mod: RHCUB | Performed by: FAMILY MEDICINE

## 2023-10-02 PROCEDURE — 99214 PR OFFICE/OUTPT VISIT, EST, LEVL IV, 30-39 MIN: ICD-10-PCS | Mod: ,,, | Performed by: FAMILY MEDICINE

## 2023-10-02 RX ORDER — PREDNISOLONE ACETATE 10 MG/ML
SUSPENSION/ DROPS OPHTHALMIC
COMMUNITY
Start: 2023-09-27

## 2023-10-02 RX ORDER — MOXIFLOXACIN 5 MG/ML
SOLUTION/ DROPS OPHTHALMIC
COMMUNITY
Start: 2023-09-27

## 2023-10-02 RX ORDER — CODEINE PHOSPHATE AND GUAIFENESIN 10; 100 MG/5ML; MG/5ML
5 SOLUTION ORAL 3 TIMES DAILY PRN
Qty: 118 ML | Refills: 0 | Status: SHIPPED | OUTPATIENT
Start: 2023-10-02 | End: 2023-10-12

## 2023-10-02 RX ORDER — KETOROLAC TROMETHAMINE 5 MG/ML
SOLUTION OPHTHALMIC
COMMUNITY
Start: 2023-09-27

## 2023-10-02 NOTE — PROGRESS NOTES
"              Westley Lim IV, DO  [facility]  [address] [place] [address]  Phone: [phone]      Subjective     Name: Frannie Wetzel   Sex: female  YOB: 1945 (78 y.o.)  MRN: [id number]  Visit Date: 10/02/2023   Chief Complaint: Nasal Congestion and Cough        HISTORY OF PRESENT ILLNESS:    Chief Complaint   Patient presents with    Nasal Congestion    Cough       One acute problem with systemic symptoms.  Prescription medication management done today.  See tests that keep you healthy and the problem oriented documentation below.    Tests to Keep You Healthy    Eye Exam: Met on 9/7/2023  Last Blood Pressure <= 139/89 (10/2/2023): NO      Portions of this note may have been created with voice recognition software. Occasional "wrong-word" or "sound-a-like" substitutions may have occurred due to the inherent limitations of voice recognition software. Please, read the note carefully and recognize, using context, where substitutions have occurred.     PAST MEDICAL HISTORY:  Significant Diagnoses - Patient  has a past medical history of Diabetes mellitus, type 2, Hyperlipidemia, and Hypothyroidism.  Medications - Patient has a current medication list which includes the following long-term medication(s): chlorthalidone, gabapentin, gemfibrozil, glimepiride, latanoprost, levothyroxine, metformin, nifedipine, pantoprazole, pravastatin, and valsartan.   Allergies - Patient is allergic to amoxapine and ropinirole.  Surgeries - Patient  has a past surgical history that includes Cholecystectomy; Tonsillectomy; and Adenoidectomy.  Family History - Patient family history includes Alzheimer's disease in her mother; Diabetes in her mother; Heart disease in her father and mother; Hypertension in her mother.      SOCIAL HISTORY:  Tobacco - Patient  reports that she has never smoked. She has never been exposed to tobacco smoke. She has never used smokeless tobacco.   Alcohol - Patient " " reports no history of alcohol use.   Recreational Drugs - Patient  reports no history of drug use.       Review of Systems   All other systems reviewed and are negative.       Past Medical History:   Diagnosis Date    Diabetes mellitus, type 2     Hyperlipidemia     Hypothyroidism         Review of patient's allergies indicates:   Allergen Reactions    Amoxapine Rash    Ropinirole Nausea And Vomiting        Past Surgical History:   Procedure Laterality Date    ADENOIDECTOMY      CHOLECYSTECTOMY      TONSILLECTOMY          Family History   Problem Relation Age of Onset    Diabetes Mother     Hypertension Mother     Heart disease Mother     Alzheimer's disease Mother     Heart disease Father        Current Outpatient Medications   Medication Instructions    allopurinoL (ZYLOPRIM) 300 mg, Oral, Daily    chlorthalidone (HYGROTEN) 25 mg, Oral, Every morning    fluticasone propionate (FLONASE) 50 mcg/actuation nasal spray SMARTSI Puff(s) Both Nares Daily    gabapentin (NEURONTIN) 300 mg, Oral, 3 times daily    gemfibroziL (LOPID) 600 mg, Oral, Daily    glimepiride (AMARYL) 0.5 mg, Oral, Before breakfast    guaiFENesin-codeine 100-10 mg/5 ml (TUSSI-ORGANIDIN NR)  mg/5 mL syrup 5 mLs, Oral, 3 times daily PRN    ketorolac 0.5% (ACULAR) 0.5 % Drop Both Eyes    latanoprost 0.005 % ophthalmic solution 1 drop, Both Eyes, Nightly    levothyroxine (SYNTHROID) 25 mcg, Oral, Before breakfast    metFORMIN (GLUCOPHAGE-XR) 500 mg, Oral, With breakfast    moxifloxacin (VIGAMOX) 0.5 % ophthalmic solution Both Eyes    NIFEdipine (ADALAT CC) 30 mg, Oral, Daily    pantoprazole (PROTONIX) 40 mg, Oral, Daily    pravastatin (PRAVACHOL) 40 mg, Oral, Daily    prednisoLONE acetate (PRED FORTE) 1 % DrpS Both Eyes    valsartan (DIOVAN) 320 mg, Oral, Daily        Objective     BP (!) 148/79   Pulse 77   Ht 5' 3" (1.6 m)   Wt 71.2 kg (157 lb)   SpO2 95%   BMI 27.81 kg/m²     Physical Exam  Constitutional:  "      General: She is not in acute distress.     Appearance: Normal appearance. She is not ill-appearing.   HENT:      Head: Normocephalic and atraumatic.      Right Ear: External ear normal.      Left Ear: External ear normal.   Eyes:      Extraocular Movements: Extraocular movements intact.      Conjunctiva/sclera: Conjunctivae normal.   Cardiovascular:      Rate and Rhythm: Normal rate.      Heart sounds: No murmur heard.  Pulmonary:      Effort: Pulmonary effort is normal.   Musculoskeletal:      Cervical back: Normal range of motion.   Skin:     General: Skin is warm and dry.      Coloration: Skin is not jaundiced.      Findings: No rash.   Neurological:      Mental Status: She is alert.   Psychiatric:         Mood and Affect: Mood normal.        All recently obtained labs have been reviewed and discussed in detail with the patient.   Assessment     1. Encounter for laboratory testing for COVID-19 virus    2. Acute cough    3. SARS-CoV-2 positive         Plan        Problem List Items Addressed This Visit    None  Visit Diagnoses       Encounter for laboratory testing for COVID-19 virus    -  Primary    Relevant Orders    POCT SARS-COV2 (COVID) with Flu Antigen (Completed)    Acute cough        Relevant Medications    guaiFENesin-codeine 100-10 mg/5 ml (TUSSI-ORGANIDIN NR)  mg/5 mL syrup    SARS-CoV-2 positive                No follow-ups on file.    Patient advised that is symptoms worsen, they should call or report directly to local emergency department.    Westley Lim DO  [facility]

## 2023-10-24 DIAGNOSIS — N18.31 TYPE 2 DIABETES MELLITUS WITH STAGE 3A CHRONIC KIDNEY DISEASE, WITHOUT LONG-TERM CURRENT USE OF INSULIN: ICD-10-CM

## 2023-10-24 DIAGNOSIS — E11.22 TYPE 2 DIABETES MELLITUS WITH STAGE 3A CHRONIC KIDNEY DISEASE, WITHOUT LONG-TERM CURRENT USE OF INSULIN: ICD-10-CM

## 2023-10-24 RX ORDER — METFORMIN HYDROCHLORIDE 500 MG/1
500 TABLET, EXTENDED RELEASE ORAL
Qty: 90 TABLET | Refills: 3 | Status: SHIPPED | OUTPATIENT
Start: 2023-10-24 | End: 2023-11-09

## 2023-11-09 ENCOUNTER — OFFICE VISIT (OUTPATIENT)
Dept: FAMILY MEDICINE | Facility: CLINIC | Age: 78
End: 2023-11-09
Payer: MEDICARE

## 2023-11-09 VITALS
BODY MASS INDEX: 27.82 KG/M2 | WEIGHT: 157 LBS | HEIGHT: 63 IN | DIASTOLIC BLOOD PRESSURE: 66 MMHG | HEART RATE: 79 BPM | SYSTOLIC BLOOD PRESSURE: 135 MMHG

## 2023-11-09 DIAGNOSIS — E11.42 POLYNEUROPATHY DUE TO TYPE 2 DIABETES MELLITUS: ICD-10-CM

## 2023-11-09 DIAGNOSIS — E78.5 HYPERLIPIDEMIA, UNSPECIFIED HYPERLIPIDEMIA TYPE: ICD-10-CM

## 2023-11-09 DIAGNOSIS — M1A.9XX0 CHRONIC GOUT INVOLVING TOE OF LEFT FOOT WITHOUT TOPHUS, UNSPECIFIED CAUSE: ICD-10-CM

## 2023-11-09 DIAGNOSIS — K21.9 GASTROESOPHAGEAL REFLUX DISEASE, UNSPECIFIED WHETHER ESOPHAGITIS PRESENT: ICD-10-CM

## 2023-11-09 DIAGNOSIS — E03.9 HYPOTHYROIDISM, UNSPECIFIED TYPE: ICD-10-CM

## 2023-11-09 DIAGNOSIS — N18.31 TYPE 2 DIABETES MELLITUS WITH STAGE 3A CHRONIC KIDNEY DISEASE, WITHOUT LONG-TERM CURRENT USE OF INSULIN: Primary | ICD-10-CM

## 2023-11-09 DIAGNOSIS — E11.22 TYPE 2 DIABETES MELLITUS WITH STAGE 3A CHRONIC KIDNEY DISEASE, WITHOUT LONG-TERM CURRENT USE OF INSULIN: Primary | ICD-10-CM

## 2023-11-09 LAB
EST. AVERAGE GLUCOSE BLD GHB EST-MCNC: 174 MG/DL
HBA1C MFR BLD HPLC: 7.7 % (ref 4.5–6.6)
TSH SERPL DL<=0.005 MIU/L-ACNC: 2.33 UIU/ML (ref 0.36–3.74)

## 2023-11-09 PROCEDURE — G0008 ADMIN INFLUENZA VIRUS VAC: HCPCS | Mod: ,,, | Performed by: FAMILY MEDICINE

## 2023-11-09 PROCEDURE — 84443 TSH: ICD-10-PCS | Mod: ,,, | Performed by: CLINICAL MEDICAL LABORATORY

## 2023-11-09 PROCEDURE — 99214 PR OFFICE/OUTPT VISIT, EST, LEVL IV, 30-39 MIN: ICD-10-PCS | Mod: ,,, | Performed by: FAMILY MEDICINE

## 2023-11-09 PROCEDURE — 99214 OFFICE O/P EST MOD 30 MIN: CPT | Mod: ,,, | Performed by: FAMILY MEDICINE

## 2023-11-09 PROCEDURE — 83036 HEMOGLOBIN GLYCOSYLATED A1C: CPT | Mod: ,,, | Performed by: CLINICAL MEDICAL LABORATORY

## 2023-11-09 PROCEDURE — G0008 FLU VACCINE - QUADRIVALENT - ADJUVANTED: ICD-10-PCS | Mod: ,,, | Performed by: FAMILY MEDICINE

## 2023-11-09 PROCEDURE — 90694 FLU VACCINE - QUADRIVALENT - ADJUVANTED: ICD-10-PCS | Mod: ,,, | Performed by: FAMILY MEDICINE

## 2023-11-09 PROCEDURE — 84443 ASSAY THYROID STIM HORMONE: CPT | Mod: ,,, | Performed by: CLINICAL MEDICAL LABORATORY

## 2023-11-09 PROCEDURE — 90694 VACC AIIV4 NO PRSRV 0.5ML IM: CPT | Mod: ,,, | Performed by: FAMILY MEDICINE

## 2023-11-09 PROCEDURE — 83036 HEMOGLOBIN A1C: ICD-10-PCS | Mod: ,,, | Performed by: CLINICAL MEDICAL LABORATORY

## 2023-11-09 RX ORDER — GLIMEPIRIDE 1 MG/1
0.5 TABLET ORAL
Qty: 45 TABLET | Refills: 3 | Status: CANCELLED | OUTPATIENT
Start: 2023-11-09 | End: 2024-11-08

## 2023-11-09 RX ORDER — METFORMIN HYDROCHLORIDE EXTENDED-RELEASE TABLETS 500 MG/1
500 TABLET, FILM COATED, EXTENDED RELEASE ORAL 2 TIMES DAILY WITH MEALS
COMMUNITY
End: 2023-11-09 | Stop reason: SDUPTHER

## 2023-11-09 RX ORDER — METFORMIN HYDROCHLORIDE EXTENDED-RELEASE TABLETS 1000 MG/1
1000 TABLET, FILM COATED, EXTENDED RELEASE ORAL 2 TIMES DAILY WITH MEALS
Qty: 180 TABLET | Refills: 1 | Status: SHIPPED | OUTPATIENT
Start: 2023-11-09 | End: 2024-05-07

## 2023-11-09 RX ORDER — GABAPENTIN 300 MG/1
300 CAPSULE ORAL 3 TIMES DAILY
Qty: 270 CAPSULE | Refills: 1 | Status: SHIPPED | OUTPATIENT
Start: 2023-11-09 | End: 2024-05-07

## 2023-11-09 RX ORDER — PANTOPRAZOLE SODIUM 40 MG/1
40 TABLET, DELAYED RELEASE ORAL 2 TIMES DAILY
Qty: 180 TABLET | Refills: 3 | Status: SHIPPED | OUTPATIENT
Start: 2023-11-09 | End: 2024-11-08

## 2023-11-09 RX ORDER — GEMFIBROZIL 600 MG/1
600 TABLET, FILM COATED ORAL DAILY
Qty: 90 TABLET | Refills: 1 | Status: SHIPPED | OUTPATIENT
Start: 2023-11-09 | End: 2024-01-23 | Stop reason: SDUPTHER

## 2023-11-09 RX ORDER — LEVOTHYROXINE SODIUM 25 UG/1
25 TABLET ORAL
Qty: 90 TABLET | Refills: 1 | Status: SHIPPED | OUTPATIENT
Start: 2023-11-09 | End: 2024-05-07

## 2023-11-09 RX ORDER — PRAVASTATIN SODIUM 40 MG/1
40 TABLET ORAL DAILY
Qty: 90 TABLET | Refills: 1 | Status: SHIPPED | OUTPATIENT
Start: 2023-11-09

## 2023-11-09 RX ORDER — ALLOPURINOL 300 MG/1
300 TABLET ORAL DAILY
Qty: 90 TABLET | Refills: 1 | Status: SHIPPED | OUTPATIENT
Start: 2023-11-09

## 2023-11-09 NOTE — PROGRESS NOTES
"              Westley Lim IV, DO  [facility] [place]  [address], [place] [address]  Phone: [phone]      Subjective     Name: Frannie Wetzel   Sex: female  YOB: 1945 (78 y.o.)  MRN: [id number]  Visit Date: 11/09/2023   Chief Complaint: Medication Refill and Diabetes        HISTORY OF PRESENT ILLNESS:    Chief Complaint   Patient presents with    Medication Refill    Diabetes       HPI  See tests that keep you healthy and the problem oriented documentation below.    All of your core healthy metrics are met.      Portions of this note may have been created with voice recognition software. Occasional "wrong-word" or "sound-a-like" substitutions may have occurred due to the inherent limitations of voice recognition software. Please, read the note carefully and recognize, using context, where substitutions have occurred.     PAST MEDICAL HISTORY:  Significant Diagnoses - Patient  has a past medical history of Diabetes mellitus, type 2, Hyperlipidemia, and Hypothyroidism.  Medications - Patient has a current medication list which includes the following long-term medication(s): chlorthalidone, glimepiride, latanoprost, nifedipine, valsartan, gabapentin, gemfibrozil, levothyroxine, metformin, pantoprazole, and pravastatin.   Allergies - Patient is allergic to amoxapine and ropinirole.  Surgeries - Patient  has a past surgical history that includes Cholecystectomy; Tonsillectomy; and Adenoidectomy.  Family History - Patient family history includes Alzheimer's disease in her mother; Diabetes in her mother; Heart disease in her father and mother; Hypertension in her mother.      SOCIAL HISTORY:  Tobacco - Patient  reports that she has never smoked. She has never been exposed to tobacco smoke. She has never used smokeless tobacco.   Alcohol - Patient  reports no history of alcohol use.   Recreational Drugs - Patient  reports no history of drug use.       Review of Systems   All other " "systems reviewed and are negative.       Past Medical History:   Diagnosis Date    Diabetes mellitus, type 2     Hyperlipidemia     Hypothyroidism         Review of patient's allergies indicates:   Allergen Reactions    Amoxapine Rash    Ropinirole Nausea And Vomiting        Past Surgical History:   Procedure Laterality Date    ADENOIDECTOMY      CHOLECYSTECTOMY      TONSILLECTOMY          Family History   Problem Relation Age of Onset    Diabetes Mother     Hypertension Mother     Heart disease Mother     Alzheimer's disease Mother     Heart disease Father        Current Outpatient Medications   Medication Instructions    allopurinoL (ZYLOPRIM) 300 mg, Oral, Daily    chlorthalidone (HYGROTEN) 25 mg, Oral, Every morning    fluticasone propionate (FLONASE) 50 mcg/actuation nasal spray SMARTSI Puff(s) Both Nares Daily    gabapentin (NEURONTIN) 300 mg, Oral, 3 times daily    gemfibroziL (LOPID) 600 mg, Oral, Daily    glimepiride (AMARYL) 0.5 mg, Oral, Before breakfast    ketorolac 0.5% (ACULAR) 0.5 % Drop Both Eyes    latanoprost 0.005 % ophthalmic solution 1 drop, Both Eyes, Nightly    levothyroxine (SYNTHROID) 25 mcg, Oral, Before breakfast    metFORMIN (FORTAMET) 1,000 mg, Oral, 2 times daily with meals    moxifloxacin (VIGAMOX) 0.5 % ophthalmic solution Both Eyes    NIFEdipine (ADALAT CC) 30 mg, Oral, Daily    pantoprazole (PROTONIX) 40 mg, Oral, 2 times daily    pravastatin (PRAVACHOL) 40 mg, Oral, Daily    prednisoLONE acetate (PRED FORTE) 1 % DrpS Both Eyes    valsartan (DIOVAN) 320 mg, Oral, Daily        Objective     /66   Pulse 79   Ht 5' 3" (1.6 m)   Wt 71.2 kg (157 lb)   BMI 27.81 kg/m²     Physical Exam  Constitutional:       General: She is not in acute distress.     Appearance: Normal appearance. She is not ill-appearing.   HENT:      Head: Normocephalic and atraumatic.      Right Ear: External ear normal.      Left Ear: External ear normal.   Eyes:      " Extraocular Movements: Extraocular movements intact.      Conjunctiva/sclera: Conjunctivae normal.   Cardiovascular:      Rate and Rhythm: Normal rate.      Heart sounds: No murmur heard.  Pulmonary:      Effort: Pulmonary effort is normal.   Musculoskeletal:      Cervical back: Normal range of motion.   Skin:     General: Skin is warm and dry.      Coloration: Skin is not jaundiced.      Findings: No rash.   Neurological:      Mental Status: She is alert.   Psychiatric:         Mood and Affect: Mood normal.        All recently obtained labs have been reviewed and discussed in detail with the patient.   Assessment     1. Type 2 diabetes mellitus with stage 3a chronic kidney disease, without long-term current use of insulin    2. Hyperlipidemia, unspecified hyperlipidemia type    3. Hypothyroidism, unspecified type    4. Polyneuropathy due to type 2 diabetes mellitus    5. Chronic gout involving toe of left foot without tophus, unspecified cause    6. Gastroesophageal reflux disease, unspecified whether esophagitis present         Plan        Problem List Items Addressed This Visit          Neuro    Polyneuropathy due to type 2 diabetes mellitus (Chronic)    Relevant Medications    metFORMIN (FORTAMET) 1,000 mg 24hr tablet    gabapentin (NEURONTIN) 300 MG capsule       Cardiac/Vascular    Hyperlipidemia    Relevant Medications    pravastatin (PRAVACHOL) 40 MG tablet    gemfibroziL (LOPID) 600 MG tablet       Endocrine    Type 2 diabetes mellitus with stage 3a chronic kidney disease, without long-term current use of insulin - Primary (Chronic)    Relevant Medications    metFORMIN (FORTAMET) 1,000 mg 24hr tablet    Other Relevant Orders    Hemoglobin A1C    Hypothyroidism (Chronic)    Relevant Medications    levothyroxine (SYNTHROID) 25 MCG tablet    Other Relevant Orders    TSH       GI    Gastroesophageal reflux disease (Chronic)    Relevant Medications    pantoprazole (PROTONIX) 40 MG tablet       Orthopedic    Gout     Relevant Medications    allopurinoL (ZYLOPRIM) 300 MG tablet       No follow-ups on file.    Patient advised that is symptoms worsen, they should call or report directly to local emergency department.    Westley Lim DO  [facility]

## 2024-01-04 ENCOUNTER — TELEPHONE (OUTPATIENT)
Dept: FAMILY MEDICINE | Facility: CLINIC | Age: 79
End: 2024-01-04
Payer: MEDICARE

## 2024-01-08 ENCOUNTER — OFFICE VISIT (OUTPATIENT)
Dept: FAMILY MEDICINE | Facility: CLINIC | Age: 79
End: 2024-01-08
Payer: MEDICARE

## 2024-01-08 VITALS
BODY MASS INDEX: 27.64 KG/M2 | SYSTOLIC BLOOD PRESSURE: 139 MMHG | HEART RATE: 79 BPM | DIASTOLIC BLOOD PRESSURE: 66 MMHG | HEIGHT: 63 IN | WEIGHT: 156 LBS

## 2024-01-08 DIAGNOSIS — E21.3 HYPERPARATHYROIDISM: ICD-10-CM

## 2024-01-08 DIAGNOSIS — J02.9 PHARYNGITIS, UNSPECIFIED ETIOLOGY: ICD-10-CM

## 2024-01-08 DIAGNOSIS — E11.22 TYPE 2 DIABETES MELLITUS WITH STAGE 3A CHRONIC KIDNEY DISEASE, WITHOUT LONG-TERM CURRENT USE OF INSULIN: ICD-10-CM

## 2024-01-08 DIAGNOSIS — N18.31 TYPE 2 DIABETES MELLITUS WITH STAGE 3A CHRONIC KIDNEY DISEASE, WITHOUT LONG-TERM CURRENT USE OF INSULIN: ICD-10-CM

## 2024-01-08 DIAGNOSIS — Z20.9 EXPOSURE TO COMMUNICABLE DISEASE: ICD-10-CM

## 2024-01-08 DIAGNOSIS — Z20.822 COVID-19 RULED OUT: Primary | ICD-10-CM

## 2024-01-08 DIAGNOSIS — E11.42 POLYNEUROPATHY DUE TO TYPE 2 DIABETES MELLITUS: ICD-10-CM

## 2024-01-08 LAB
CTP QC/QA: YES
CTP QC/QA: YES
FLUAV AG NPH QL: NEGATIVE
FLUBV AG NPH QL: NEGATIVE
SARS-COV-2 RDRP RESP QL NAA+PROBE: NEGATIVE

## 2024-01-08 PROCEDURE — 87635 SARS-COV-2 COVID-19 AMP PRB: CPT | Mod: RHCUB | Performed by: FAMILY MEDICINE

## 2024-01-08 PROCEDURE — 87804 INFLUENZA ASSAY W/OPTIC: CPT | Mod: RHCUB | Performed by: FAMILY MEDICINE

## 2024-01-08 PROCEDURE — 99214 OFFICE O/P EST MOD 30 MIN: CPT | Mod: ,,, | Performed by: FAMILY MEDICINE

## 2024-01-08 RX ORDER — AMOXICILLIN AND CLAVULANATE POTASSIUM 875; 125 MG/1; MG/1
1 TABLET, FILM COATED ORAL 2 TIMES DAILY
Qty: 14 TABLET | Refills: 0 | Status: SHIPPED | OUTPATIENT
Start: 2024-01-08 | End: 2024-01-15

## 2024-01-08 NOTE — PROGRESS NOTES
"              Westley Lim IV, DO  [facility] [place]  [address], [place] [address]  Phone: [phone]      Subjective     Name: Frannie Wetzel   Sex: female  YOB: 1945 (78 y.o.)  MRN: [id number]  Visit Date: 01/08/2024   Chief Complaint: Cough, Sore Throat, and Fatigue        HISTORY OF PRESENT ILLNESS:    Chief Complaint   Patient presents with    Cough    Sore Throat    Fatigue       HPI  See tests that keep you healthy and the problem oriented documentation below.    All of your core healthy metrics are met.      Portions of this note may have been created with voice recognition software. Occasional "wrong-word" or "sound-a-like" substitutions may have occurred due to the inherent limitations of voice recognition software. Please, read the note carefully and recognize, using context, where substitutions have occurred.     PAST MEDICAL HISTORY:  Significant Diagnoses - Patient  has a past medical history of Diabetes mellitus, type 2, Hyperlipidemia, and Hypothyroidism.  Medications - Patient has a current medication list which includes the following long-term medication(s): chlorthalidone, gabapentin, gemfibrozil, glimepiride, latanoprost, levothyroxine, metformin, nifedipine, pantoprazole, pravastatin, and valsartan.   Allergies - Patient is allergic to amoxapine and ropinirole.  Surgeries - Patient  has a past surgical history that includes Cholecystectomy; Tonsillectomy; and Adenoidectomy.  Family History - Patient family history includes Alzheimer's disease in her mother; Diabetes in her mother; Heart disease in her father and mother; Hypertension in her mother.      SOCIAL HISTORY:  Tobacco - Patient  reports that she has never smoked. She has never been exposed to tobacco smoke. She has never used smokeless tobacco.   Alcohol - Patient  reports no history of alcohol use.   Recreational Drugs - Patient  reports no history of drug use.       Review of Systems   All " "other systems reviewed and are negative.       Past Medical History:   Diagnosis Date    Diabetes mellitus, type 2     Hyperlipidemia     Hypothyroidism         Review of patient's allergies indicates:   Allergen Reactions    Amoxapine Rash    Ropinirole Nausea And Vomiting        Past Surgical History:   Procedure Laterality Date    ADENOIDECTOMY      CHOLECYSTECTOMY      TONSILLECTOMY          Family History   Problem Relation Age of Onset    Diabetes Mother     Hypertension Mother     Heart disease Mother     Alzheimer's disease Mother     Heart disease Father        Current Outpatient Medications   Medication Instructions    allopurinoL (ZYLOPRIM) 300 mg, Oral, Daily    amoxicillin-clavulanate 875-125mg (AUGMENTIN) 875-125 mg per tablet 1 tablet, Oral, 2 times daily    chlorthalidone (HYGROTEN) 25 mg, Oral, Every morning    fluticasone propionate (FLONASE) 50 mcg/actuation nasal spray SMARTSI Puff(s) Both Nares Daily    gabapentin (NEURONTIN) 300 mg, Oral, 3 times daily    gemfibroziL (LOPID) 600 mg, Oral, Daily    glimepiride (AMARYL) 0.5 mg, Oral, Before breakfast    ketorolac 0.5% (ACULAR) 0.5 % Drop Both Eyes    latanoprost 0.005 % ophthalmic solution 1 drop, Both Eyes, Nightly    levothyroxine (SYNTHROID) 25 mcg, Oral, Before breakfast    metFORMIN (FORTAMET) 1,000 mg, Oral, 2 times daily with meals    moxifloxacin (VIGAMOX) 0.5 % ophthalmic solution Both Eyes    NIFEdipine (ADALAT CC) 30 mg, Oral, Daily    pantoprazole (PROTONIX) 40 mg, Oral, 2 times daily    pravastatin (PRAVACHOL) 40 mg, Oral, Daily    prednisoLONE acetate (PRED FORTE) 1 % DrpS Both Eyes    valsartan (DIOVAN) 320 mg, Oral, Daily        Objective     /66   Pulse 79   Ht 5' 3" (1.6 m)   Wt 70.8 kg (156 lb)   BMI 27.63 kg/m²     Physical Exam  Constitutional:       General: She is not in acute distress.     Appearance: Normal appearance. She is not ill-appearing.   HENT:      Head: Normocephalic " and atraumatic.      Right Ear: External ear normal.      Left Ear: External ear normal.   Eyes:      Extraocular Movements: Extraocular movements intact.      Conjunctiva/sclera: Conjunctivae normal.   Cardiovascular:      Rate and Rhythm: Normal rate.      Heart sounds: No murmur heard.  Pulmonary:      Effort: Pulmonary effort is normal.   Musculoskeletal:      Cervical back: Normal range of motion.   Skin:     General: Skin is warm and dry.      Coloration: Skin is not jaundiced.      Findings: No rash.   Neurological:      Mental Status: She is alert.   Psychiatric:         Mood and Affect: Mood normal.        All recently obtained labs have been reviewed and discussed in detail with the patient.   Assessment     1. COVID-19 ruled out    2. Exposure to communicable disease    3. Pharyngitis, unspecified etiology         Plan        Problem List Items Addressed This Visit    None  Visit Diagnoses       COVID-19 ruled out    -  Primary    Relevant Orders    POCT COVID-19 Rapid Screening (Completed)    Exposure to communicable disease        Relevant Orders    POCT Influenza A/B Rapid Antigen (Completed)    Pharyngitis, unspecified etiology        Acute illness with systemic symptoms including fever.    Relevant Medications    amoxicillin-clavulanate 875-125mg (AUGMENTIN) 875-125 mg per tablet            No follow-ups on file.    Patient advised that is symptoms worsen, they should call or report directly to local emergency department.    Westley Lim,   [facility]

## 2024-01-23 DIAGNOSIS — E78.5 HYPERLIPIDEMIA, UNSPECIFIED HYPERLIPIDEMIA TYPE: ICD-10-CM

## 2024-01-23 RX ORDER — GEMFIBROZIL 600 MG/1
600 TABLET, FILM COATED ORAL DAILY
Qty: 90 TABLET | Refills: 1 | Status: SHIPPED | OUTPATIENT
Start: 2024-01-23

## 2024-01-23 NOTE — TELEPHONE ENCOUNTER
----- Message from Shabana Lopez sent at 1/23/2024 10:07 AM CST -----  Please call patient [phone]

## 2024-02-01 ENCOUNTER — HOSPITAL ENCOUNTER (OUTPATIENT)
Dept: RADIOLOGY | Facility: HOSPITAL | Age: 79
Discharge: HOME | End: 2024-02-01
Attending: RADIOLOGY
Payer: MEDICARE

## 2024-02-01 DIAGNOSIS — Z12.31 VISIT FOR SCREENING MAMMOGRAM: ICD-10-CM

## 2024-02-01 PROCEDURE — 77067 SCR MAMMO BI INCL CAD: CPT | Mod: 26,,, | Performed by: RADIOLOGY

## 2024-02-01 PROCEDURE — 77067 SCR MAMMO BI INCL CAD: CPT | Mod: TC

## 2024-02-01 PROCEDURE — 77063 BREAST TOMOSYNTHESIS BI: CPT | Mod: 26,,, | Performed by: RADIOLOGY

## 2024-03-19 ENCOUNTER — OFFICE VISIT (OUTPATIENT)
Dept: FAMILY MEDICINE | Facility: CLINIC | Age: 79
End: 2024-03-19
Payer: MEDICARE

## 2024-03-19 VITALS
WEIGHT: 157.88 LBS | RESPIRATION RATE: 16 BRPM | OXYGEN SATURATION: 99 % | DIASTOLIC BLOOD PRESSURE: 80 MMHG | BODY MASS INDEX: 27.97 KG/M2 | HEIGHT: 63 IN | TEMPERATURE: 97 F | HEART RATE: 72 BPM | SYSTOLIC BLOOD PRESSURE: 138 MMHG

## 2024-03-19 DIAGNOSIS — R30.0 DYSURIA: ICD-10-CM

## 2024-03-19 DIAGNOSIS — N30.91 CYSTITIS WITH HEMATURIA: Primary | ICD-10-CM

## 2024-03-19 LAB
BILIRUB SERPL-MCNC: NEGATIVE MG/DL
BLOOD URINE, POC: NORMAL
CLARITY, POC UA: NORMAL
COLOR, POC UA: YELLOW
GLUCOSE UR QL STRIP: NEGATIVE
KETONES UR QL STRIP: NORMAL
LEUKOCYTE ESTERASE URINE, POC: NORMAL
NITRITE, POC UA: NEGATIVE
PH, POC UA: 6
PROTEIN, POC: NORMAL
SPECIFIC GRAVITY, POC UA: 1
UROBILINOGEN, POC UA: 0.2

## 2024-03-19 PROCEDURE — 99213 OFFICE O/P EST LOW 20 MIN: CPT | Mod: 25,,, | Performed by: NURSE PRACTITIONER

## 2024-03-19 PROCEDURE — 87086 URINE CULTURE/COLONY COUNT: CPT | Mod: ,,, | Performed by: CLINICAL MEDICAL LABORATORY

## 2024-03-19 PROCEDURE — 87186 SC STD MICRODIL/AGAR DIL: CPT | Mod: ,,, | Performed by: CLINICAL MEDICAL LABORATORY

## 2024-03-19 PROCEDURE — 96372 THER/PROPH/DIAG INJ SC/IM: CPT | Mod: ,,, | Performed by: NURSE PRACTITIONER

## 2024-03-19 PROCEDURE — 87147 CULTURE TYPE IMMUNOLOGIC: CPT | Mod: ,,, | Performed by: CLINICAL MEDICAL LABORATORY

## 2024-03-19 PROCEDURE — 81002 URINALYSIS NONAUTO W/O SCOPE: CPT | Mod: ,,, | Performed by: NURSE PRACTITIONER

## 2024-03-19 PROCEDURE — 87077 CULTURE AEROBIC IDENTIFY: CPT | Mod: ,,, | Performed by: CLINICAL MEDICAL LABORATORY

## 2024-03-19 RX ORDER — NITROFURANTOIN 25; 75 MG/1; MG/1
100 CAPSULE ORAL 2 TIMES DAILY
Qty: 10 CAPSULE | Refills: 0 | Status: SHIPPED | OUTPATIENT
Start: 2024-03-19 | End: 2024-03-24

## 2024-03-19 RX ORDER — CEFTRIAXONE 1 G/1
1 INJECTION, POWDER, FOR SOLUTION INTRAMUSCULAR; INTRAVENOUS
Status: COMPLETED | OUTPATIENT
Start: 2024-03-19 | End: 2024-03-19

## 2024-03-19 RX ADMIN — CEFTRIAXONE 1 G: 1 INJECTION, POWDER, FOR SOLUTION INTRAMUSCULAR; INTRAVENOUS at 02:03

## 2024-03-19 NOTE — PROGRESS NOTES
"Subjective:      Frannie Wetzel is a 78 y.o. female who complains of burning with urination and frequency. She has had symptoms for 1 week.     . Patient denies back pain, fever, and vaginal discharge. Patient does not have a history of recurrent UTI. Patient does not have a history of pyelonephritis.     Review of Systems  Pertinent items are noted in HPI.      Objective:      /80 (BP Location: Left arm, Patient Position: Sitting, BP Method: Large (Manual))   Pulse 72   Temp 97.3 °F (36.3 °C) (Oral)   Resp 16   Ht 5' 3" (1.6 m)   Wt 71.6 kg (157 lb 14.4 oz)   SpO2 99%   BMI 27.97 kg/m²   General appearance: alert, appears stated age, and cooperative  Back: symmetric, no curvature. ROM normal. No CVA tenderness.  Lungs: clear to auscultation bilaterally  Heart: regular rate and rhythm, S1, S2 normal, no murmur, click, rub or gallop  Extremities: extremities normal, atraumatic, no cyanosis or edema  Skin: Skin color, texture, turgor normal. No rashes or lesions  Neurologic: Alert and oriented X 3, normal strength and tone. Normal symmetric reflexes. Normal coordination and gait    Laboratory:   Urine dipstick:  see resutls .    Micro exam: not done.      Assessment:      Acute cystitis       Plan:      Medications: nitrofurantoin.  Maintain adequate hydration.  Follow up if symptoms not improving, and as needed.   "

## 2024-03-22 ENCOUNTER — TELEPHONE (OUTPATIENT)
Dept: FAMILY MEDICINE | Facility: CLINIC | Age: 79
End: 2024-03-22
Payer: MEDICARE

## 2024-03-22 DIAGNOSIS — N39.0 URINARY TRACT INFECTION WITHOUT HEMATURIA, SITE UNSPECIFIED: Primary | ICD-10-CM

## 2024-03-22 LAB — UA COMPLETE W REFLEX CULTURE PNL UR: ABNORMAL

## 2024-03-22 RX ORDER — SULFAMETHOXAZOLE AND TRIMETHOPRIM 800; 160 MG/1; MG/1
1 TABLET ORAL 2 TIMES DAILY
Qty: 14 TABLET | Refills: 0 | Status: SHIPPED | OUTPATIENT
Start: 2024-03-22 | End: 2024-03-29

## 2024-03-22 NOTE — TELEPHONE ENCOUNTER
I think she was started on Macrobid or that may be an old script--she needs to start bactrim that I sent to pharmacy and stop any other abx.  Appt one week from Monday with me to recheck UA

## 2024-04-09 DIAGNOSIS — Z71.89 COMPLEX CARE COORDINATION: ICD-10-CM

## 2024-05-21 ENCOUNTER — OFFICE VISIT (OUTPATIENT)
Dept: FAMILY MEDICINE | Facility: CLINIC | Age: 79
End: 2024-05-21
Payer: MEDICARE

## 2024-05-21 VITALS
HEIGHT: 63 IN | OXYGEN SATURATION: 96 % | DIASTOLIC BLOOD PRESSURE: 78 MMHG | WEIGHT: 156.31 LBS | RESPIRATION RATE: 16 BRPM | HEART RATE: 64 BPM | TEMPERATURE: 98 F | BODY MASS INDEX: 27.7 KG/M2 | SYSTOLIC BLOOD PRESSURE: 140 MMHG

## 2024-05-21 DIAGNOSIS — E11.42 POLYNEUROPATHY DUE TO TYPE 2 DIABETES MELLITUS: ICD-10-CM

## 2024-05-21 DIAGNOSIS — R09.89 OTHER SPECIFIED SYMPTOMS AND SIGNS INVOLVING THE CIRCULATORY AND RESPIRATORY SYSTEMS: ICD-10-CM

## 2024-05-21 DIAGNOSIS — M65.312 TRIGGER FINGER OF LEFT THUMB: Primary | ICD-10-CM

## 2024-05-21 DIAGNOSIS — D50.8 IRON DEFICIENCY ANEMIA SECONDARY TO INADEQUATE DIETARY IRON INTAKE: ICD-10-CM

## 2024-05-21 DIAGNOSIS — E11.22 TYPE 2 DIABETES MELLITUS WITH STAGE 3A CHRONIC KIDNEY DISEASE, WITHOUT LONG-TERM CURRENT USE OF INSULIN: Chronic | ICD-10-CM

## 2024-05-21 DIAGNOSIS — K21.9 GASTROESOPHAGEAL REFLUX DISEASE, UNSPECIFIED WHETHER ESOPHAGITIS PRESENT: ICD-10-CM

## 2024-05-21 DIAGNOSIS — N18.31 TYPE 2 DIABETES MELLITUS WITH STAGE 3A CHRONIC KIDNEY DISEASE, WITHOUT LONG-TERM CURRENT USE OF INSULIN: Chronic | ICD-10-CM

## 2024-05-21 DIAGNOSIS — R42 ORTHOSTATIC LIGHTHEADEDNESS: ICD-10-CM

## 2024-05-21 DIAGNOSIS — I10 ESSENTIAL HYPERTENSION: ICD-10-CM

## 2024-05-21 DIAGNOSIS — E78.5 HYPERLIPIDEMIA, UNSPECIFIED HYPERLIPIDEMIA TYPE: ICD-10-CM

## 2024-05-21 DIAGNOSIS — N18.31 CHRONIC KIDNEY DISEASE, STAGE 3A: ICD-10-CM

## 2024-05-21 DIAGNOSIS — M81.0 OSTEOPOROSIS, UNSPECIFIED OSTEOPOROSIS TYPE, UNSPECIFIED PATHOLOGICAL FRACTURE PRESENCE: ICD-10-CM

## 2024-05-21 DIAGNOSIS — E03.9 HYPOTHYROIDISM, UNSPECIFIED TYPE: ICD-10-CM

## 2024-05-21 DIAGNOSIS — M1A.9XX0 CHRONIC GOUT INVOLVING TOE OF LEFT FOOT WITHOUT TOPHUS, UNSPECIFIED CAUSE: ICD-10-CM

## 2024-05-21 DIAGNOSIS — R42 DIZZINESS: ICD-10-CM

## 2024-05-21 DIAGNOSIS — E78.5 DYSLIPIDEMIA: ICD-10-CM

## 2024-05-21 DIAGNOSIS — M10.9 GOUT, UNSPECIFIED CAUSE, UNSPECIFIED CHRONICITY, UNSPECIFIED SITE: ICD-10-CM

## 2024-05-21 LAB
ALBUMIN SERPL BCP-MCNC: 3.5 G/DL (ref 3.5–5)
ALBUMIN/GLOB SERPL: 0.9 {RATIO}
ALP SERPL-CCNC: 144 U/L (ref 55–142)
ALT SERPL W P-5'-P-CCNC: 12 U/L (ref 13–56)
ANION GAP SERPL CALCULATED.3IONS-SCNC: 10 MMOL/L (ref 7–16)
AST SERPL W P-5'-P-CCNC: 15 U/L (ref 15–37)
BASOPHILS # BLD AUTO: 0.01 K/UL (ref 0–0.2)
BASOPHILS NFR BLD AUTO: 0.1 % (ref 0–1)
BILIRUB SERPL-MCNC: 0.4 MG/DL (ref ?–1.2)
BUN SERPL-MCNC: 36 MG/DL (ref 7–18)
BUN/CREAT SERPL: 29 (ref 6–20)
CALCIUM SERPL-MCNC: 9.8 MG/DL (ref 8.5–10.1)
CHLORIDE SERPL-SCNC: 109 MMOL/L (ref 98–107)
CHOLEST SERPL-MCNC: 125 MG/DL (ref 0–200)
CHOLEST/HDLC SERPL: 3.5 {RATIO}
CO2 SERPL-SCNC: 22 MMOL/L (ref 21–32)
CREAT SERPL-MCNC: 1.25 MG/DL (ref 0.55–1.02)
DIFFERENTIAL METHOD BLD: ABNORMAL
EGFR (NO RACE VARIABLE) (RUSH/TITUS): 44 ML/MIN/1.73M2
EOSINOPHIL # BLD AUTO: 0.09 K/UL (ref 0–0.5)
EOSINOPHIL NFR BLD AUTO: 1.2 % (ref 1–4)
ERYTHROCYTE [DISTWIDTH] IN BLOOD BY AUTOMATED COUNT: 15.5 % (ref 11.5–14.5)
EST. AVERAGE GLUCOSE BLD GHB EST-MCNC: 166 MG/DL
GLOBULIN SER-MCNC: 3.8 G/DL (ref 2–4)
GLUCOSE SERPL-MCNC: 155 MG/DL (ref 74–106)
HBA1C MFR BLD HPLC: 7.4 % (ref 4.5–6.6)
HCT VFR BLD AUTO: 34.6 % (ref 38–47)
HDLC SERPL-MCNC: 36 MG/DL (ref 40–60)
HGB BLD-MCNC: 10.7 G/DL (ref 12–16)
IMM GRANULOCYTES # BLD AUTO: 0.04 K/UL (ref 0–0.04)
IMM GRANULOCYTES NFR BLD: 0.5 % (ref 0–0.4)
LDLC SERPL CALC-MCNC: 40 MG/DL
LDLC/HDLC SERPL: 1.1 {RATIO}
LYMPHOCYTES # BLD AUTO: 0.82 K/UL (ref 1–4.8)
LYMPHOCYTES NFR BLD AUTO: 11.1 % (ref 27–41)
MCH RBC QN AUTO: 27 PG (ref 27–31)
MCHC RBC AUTO-ENTMCNC: 30.9 G/DL (ref 32–36)
MCV RBC AUTO: 87.4 FL (ref 80–96)
MONOCYTES # BLD AUTO: 0.46 K/UL (ref 0–0.8)
MONOCYTES NFR BLD AUTO: 6.2 % (ref 2–6)
MPC BLD CALC-MCNC: 12.7 FL (ref 9.4–12.4)
NEUTROPHILS # BLD AUTO: 5.96 K/UL (ref 1.8–7.7)
NEUTROPHILS NFR BLD AUTO: 80.9 % (ref 53–65)
NONHDLC SERPL-MCNC: 89 MG/DL
NRBC # BLD AUTO: 0 X10E3/UL
NRBC, AUTO (.00): 0 %
PLATELET # BLD AUTO: 267 K/UL (ref 150–400)
POTASSIUM SERPL-SCNC: 4.6 MMOL/L (ref 3.5–5.1)
PROT SERPL-MCNC: 7.3 G/DL (ref 6.4–8.2)
RBC # BLD AUTO: 3.96 M/UL (ref 4.2–5.4)
SODIUM SERPL-SCNC: 136 MMOL/L (ref 136–145)
T4 FREE SERPL-MCNC: 1 NG/DL (ref 0.76–1.46)
TRIGL SERPL-MCNC: 245 MG/DL (ref 35–150)
TSH SERPL DL<=0.005 MIU/L-ACNC: 1.92 UIU/ML (ref 0.36–3.74)
URATE SERPL-MCNC: 4.7 MG/DL (ref 2.6–6)
VLDLC SERPL-MCNC: 49 MG/DL
WBC # BLD AUTO: 7.38 K/UL (ref 4.5–11)

## 2024-05-21 PROCEDURE — 84550 ASSAY OF BLOOD/URIC ACID: CPT | Mod: ,,, | Performed by: CLINICAL MEDICAL LABORATORY

## 2024-05-21 PROCEDURE — 80053 COMPREHEN METABOLIC PANEL: CPT | Mod: ,,, | Performed by: CLINICAL MEDICAL LABORATORY

## 2024-05-21 PROCEDURE — 85025 COMPLETE CBC W/AUTO DIFF WBC: CPT | Mod: ,,, | Performed by: CLINICAL MEDICAL LABORATORY

## 2024-05-21 PROCEDURE — 80061 LIPID PANEL: CPT | Mod: ,,, | Performed by: CLINICAL MEDICAL LABORATORY

## 2024-05-21 PROCEDURE — 83036 HEMOGLOBIN GLYCOSYLATED A1C: CPT | Mod: ,,, | Performed by: CLINICAL MEDICAL LABORATORY

## 2024-05-21 PROCEDURE — 84439 ASSAY OF FREE THYROXINE: CPT | Mod: ,,, | Performed by: CLINICAL MEDICAL LABORATORY

## 2024-05-21 PROCEDURE — 99213 OFFICE O/P EST LOW 20 MIN: CPT | Mod: ,,, | Performed by: NURSE PRACTITIONER

## 2024-05-21 PROCEDURE — 84443 ASSAY THYROID STIM HORMONE: CPT | Mod: ,,, | Performed by: CLINICAL MEDICAL LABORATORY

## 2024-05-21 RX ORDER — NAPROXEN 500 MG/1
500 TABLET ORAL 2 TIMES DAILY WITH MEALS
COMMUNITY

## 2024-05-21 RX ORDER — METFORMIN HYDROCHLORIDE EXTENDED-RELEASE TABLETS 500 MG/1
TABLET, FILM COATED, EXTENDED RELEASE ORAL
Qty: 180 TABLET | Refills: 2 | Status: SHIPPED | OUTPATIENT
Start: 2024-05-21

## 2024-05-21 RX ORDER — GABAPENTIN 300 MG/1
300 CAPSULE ORAL 3 TIMES DAILY
Qty: 270 CAPSULE | Refills: 1 | Status: SHIPPED | OUTPATIENT
Start: 2024-05-21 | End: 2024-05-21 | Stop reason: SDUPTHER

## 2024-05-21 RX ORDER — GABAPENTIN 300 MG/1
300 CAPSULE ORAL 3 TIMES DAILY
Qty: 270 CAPSULE | Refills: 1 | Status: SHIPPED | OUTPATIENT
Start: 2024-05-21 | End: 2024-11-17

## 2024-05-21 RX ORDER — ALENDRONATE SODIUM 70 MG/1
TABLET ORAL
Qty: 12 TABLET | Refills: 2 | Status: SHIPPED | OUTPATIENT
Start: 2024-05-21

## 2024-05-21 RX ORDER — ALLOPURINOL 300 MG/1
300 TABLET ORAL DAILY
Qty: 90 TABLET | Refills: 1 | Status: SHIPPED | OUTPATIENT
Start: 2024-05-21

## 2024-05-21 NOTE — ASSESSMENT & PLAN NOTE
Protonix is being taken b.i.d..  We will refer to Gastroenterology to rule out underlying conditions.

## 2024-05-21 NOTE — PROGRESS NOTES
LANDEN Shetty   [facility]  [address] [place] [address]  [phone]      PATIENT NAME: Frannie Wetzel  : 1945  DATE: 24  MRN: [id number]      Billing Provider: LANDEN Shetty  Level of Service:   Patient PCP Information       Provider PCP Type    Westley Lim IV, DO General            Reason for Visit / Chief Complaint: Follow-up (Follow up)       Update PCP  Update Chief Complaint         History of Present Illness / Problem Focused Workflow     Frannie Wetzel presents to the clinic with Follow-up (Follow up)     Sees Cardiologist (Samreen) annually for HTN--next appt Sept and will have repeat echo  Dizziness when standing at times  Taking protonix 40 mg BID, still having reflux; requests GI referral  Left thumb trigger finger-requests appt with Dr Kelly, who repaired trigger finger other hand several years ago  Eye exams--Dr Aguilar in [place], annual exams, cataract surgery last year both eyes            Review of Systems     Review of Systems   Constitutional:  Negative for activity change, fatigue and unexpected weight change.   Respiratory:  Negative for cough.    Cardiovascular:  Negative for chest pain, palpitations and leg swelling.   Gastrointestinal:  Negative for blood in stool, change in bowel habit, constipation, diarrhea, nausea, vomiting and reflux.   Endocrine: Negative for cold intolerance, heat intolerance, polydipsia and polyuria.   Genitourinary:  Negative for dysuria.   Musculoskeletal:  Negative for arthralgias, gait problem and neck stiffness.        Left thumb trigger finger   Integumentary:  Negative for rash.   Neurological:  Negative for weakness.   Psychiatric/Behavioral:  Negative for sleep disturbance.         Denies depression  Denies anxiety        Medical / Social / Family History     Past Medical History:   Diagnosis Date    Diabetes mellitus, type 2     Hyperlipidemia     Hypothyroidism        Past  Surgical History:   Procedure Laterality Date    ADENOIDECTOMY      CHOLECYSTECTOMY      TONSILLECTOMY         Social History  Ms. Wetzel  reports that she has never smoked. She has never been exposed to tobacco smoke. She has never used smokeless tobacco. She reports that she does not drink alcohol and does not use drugs.    Family History  Ms. Wetzel's family history includes Alzheimer's disease in her mother; Diabetes in her mother; Heart disease in her father and mother; Hypertension in her mother.    Medications and Allergies     Medications  Outpatient Medications Marked as Taking for the 5/21/24 encounter (Office Visit) with Arelis Giles FNP   Medication Sig Dispense Refill    chlorthalidone (HYGROTEN) 25 MG Tab Take 1 tablet (25 mg total) by mouth every morning. 30 tablet 2    gemfibroziL (LOPID) 600 MG tablet Take 1 tablet (600 mg total) by mouth once daily. 90 tablet 1    glimepiride (AMARYL) 1 MG tablet Take 0.5 tablets (0.5 mg total) by mouth before breakfast. 45 tablet 3    latanoprost 0.005 % ophthalmic solution Place 1 drop into both eyes every evening.      naproxen (EC NAPROSYN) 500 MG EC tablet Take 500 mg by mouth 2 (two) times daily with meals.      pantoprazole (PROTONIX) 40 MG tablet Take 1 tablet (40 mg total) by mouth 2 (two) times daily. 180 tablet 3    pravastatin (PRAVACHOL) 40 MG tablet Take 1 tablet (40 mg total) by mouth once daily. 90 tablet 1    valsartan (DIOVAN) 320 MG tablet Take 320 mg by mouth once daily.      [DISCONTINUED] allopurinoL (ZYLOPRIM) 300 MG tablet Take 1 tablet (300 mg total) by mouth once daily. 90 tablet 1       Allergies  Review of patient's allergies indicates:   Allergen Reactions    Augmentin [amoxicillin-pot clavulanate] Diarrhea    Amoxapine Rash    Ropinirole Nausea And Vomiting       Physical Examination     Vitals:    05/21/24 1309   BP: (!) 140/78   Pulse: 64   Resp: 16   Temp: 98.1 °F (36.7 °C)     Physical Exam  Vitals and nursing note reviewed.    Constitutional:       Appearance: Normal appearance. She is not ill-appearing.   HENT:      Head: Normocephalic and atraumatic.      Mouth/Throat:      Mouth: Mucous membranes are moist.   Eyes:      General: No scleral icterus.     Conjunctiva/sclera: Conjunctivae normal.      Pupils: Pupils are equal, round, and reactive to light.   Cardiovascular:      Rate and Rhythm: Normal rate and regular rhythm.      Pulses: Normal pulses.      Heart sounds: Normal heart sounds. No murmur heard.  Pulmonary:      Effort: Pulmonary effort is normal. No respiratory distress.      Breath sounds: Normal breath sounds.   Abdominal:      General: Bowel sounds are normal.      Palpations: Abdomen is soft.   Musculoskeletal:      Cervical back: Normal range of motion.      Right lower leg: No edema.      Left lower leg: No edema.      Comments: Deformity of left thumb consistent with trigger finger.  Kyphosis present.   Skin:     General: Skin is warm and dry.      Capillary Refill: Capillary refill takes 2 to 3 seconds.      Coloration: Skin is not jaundiced.   Neurological:      General: No focal deficit present.      Mental Status: She is alert and oriented to person, place, and time.      Comments: Ambulates without difficulty   Psychiatric:         Mood and Affect: Mood normal.         Behavior: Behavior normal.         Thought Content: Thought content normal.         Judgment: Judgment normal.          Lab Results   Component Value Date    WBC 14.79 (H) 03/28/2023    HGB 11.1 (L) 03/28/2023    HCT 34.8 (L) 03/28/2023    MCV 90.2 03/28/2023     03/28/2023        Sodium   Date Value Ref Range Status   08/01/2023 139 136 - 145 mmol/L Final     Potassium   Date Value Ref Range Status   08/01/2023 4.1 3.5 - 5.1 mmol/L Final     Chloride   Date Value Ref Range Status   08/01/2023 107 98 - 107 mmol/L Final     CO2   Date Value Ref Range Status   08/01/2023 26 21 - 32 mmol/L Final     Glucose   Date Value Ref Range Status  "  08/01/2023 173 (H) 74 - 106 mg/dL Final     BUN   Date Value Ref Range Status   08/01/2023 23 (H) 7 - 18 mg/dL Final     Creatinine   Date Value Ref Range Status   08/01/2023 1.16 (H) 0.55 - 1.02 mg/dL Final     Calcium   Date Value Ref Range Status   08/01/2023 10.5 (H) 8.5 - 10.1 mg/dL Final     Total Protein   Date Value Ref Range Status   02/01/2023 6.9 6.4 - 8.2 g/dL Final     Albumin   Date Value Ref Range Status   02/01/2023 3.5 3.5 - 5.0 g/dL Final     Bilirubin, Total   Date Value Ref Range Status   02/01/2023 0.4 >0.0 - 1.2 mg/dL Final     Alk Phos   Date Value Ref Range Status   02/01/2023 110 55 - 142 U/L Final     AST   Date Value Ref Range Status   02/01/2023 11 (L) 15 - 37 U/L Final     ALT   Date Value Ref Range Status   02/01/2023 19 13 - 56 U/L Final     Anion Gap   Date Value Ref Range Status   08/01/2023 10 7 - 16 mmol/L Final     eGFR   Date Value Ref Range Status   08/01/2023 49 (L) >=60 mL/min/1.73m2 Final      Lab Results   Component Value Date    HGBA1C 7.7 (H) 11/09/2023      Lab Results   Component Value Date    CHOL 163 02/01/2023    CHOL 150 03/28/2022    CHOL 127 12/09/2021     Lab Results   Component Value Date    HDL 35 (L) 02/01/2023    HDL 33 (L) 03/28/2022    HDL 31 (L) 12/09/2021     Lab Results   Component Value Date    LDLCALC 70 02/01/2023    LDLCALC 48 03/28/2022    LDLCALC 41 12/09/2021     No results found for: "DLDL"  Lab Results   Component Value Date    TRIG 288 (H) 02/01/2023    TRIG 343 (H) 03/28/2022    TRIG 277 (H) 12/09/2021     Lab Results   Component Value Date    CHOLHDL 4.7 02/01/2023    CHOLHDL 4.5 03/28/2022    CHOLHDL 4.1 12/09/2021      Lab Results   Component Value Date    TSH 2.330 11/09/2023    FREET4 1.17 05/24/2022        Assessment and Plan (including Health Maintenance)      Problem List  Smart Sets  Document Outside HM   :    Plan:     1. Trigger finger of left thumb  -     Ambulatory referral/consult to Orthopedics; Future; Expected date: " 05/28/2024    2. Polyneuropathy due to type 2 diabetes mellitus  Assessment & Plan:  Neurontin effective    Orders:  -     Discontinue: gabapentin (NEURONTIN) 300 MG capsule; Take 1 capsule (300 mg total) by mouth 3 (three) times daily.  Dispense: 270 capsule; Refill: 1  -     gabapentin (NEURONTIN) 300 MG capsule; Take 1 capsule (300 mg total) by mouth 3 (three) times daily.  Dispense: 270 capsule; Refill: 1    3. Gastroesophageal reflux disease, unspecified whether esophagitis present  Assessment & Plan:  Protonix is being taken b.i.d..  We will refer to Gastroenterology to rule out underlying conditions.    Orders:  -     Ambulatory referral/consult to Gastroenterology; Future; Expected date: 05/28/2024    4. Dizziness  Comments:  Carotid ultrasound order  Orders:  -     CV Ultrasound Bilateral Doppler Carotid; Future  -     CBC Auto Differential  -     Comprehensive Metabolic Panel    5. Orthostatic lightheadedness  Comments:  We will see if we can get carotid ultrasound done.  Orders:  -     CV Ultrasound Bilateral Doppler Carotid; Future    6. Other specified symptoms and signs involving the circulatory and respiratory systems  -     CV Ultrasound Bilateral Doppler Carotid; Future  -     Comprehensive Metabolic Panel    7. Type 2 diabetes mellitus with stage 3a chronic kidney disease, without long-term current use of insulin  Assessment & Plan:  Last A1c was not within goal range.  We will reassess today and make recommendations.  Would prefer that she not take sulfonylurea but this has been an ongoing issue with her due to cost of other medications.    Orders:  -     Comprehensive Metabolic Panel  -     Lipid Panel  -     Hemoglobin A1C  -     metFORMIN (FORTAMET) 500 mg 24hr tablet; Take one PO BID  Dispense: 180 tablet; Refill: 2    8. Iron deficiency anemia secondary to inadequate dietary iron intake  Assessment & Plan:  We will assess CBC today    Orders:  -     CBC Auto Differential    9. Dyslipidemia  -      Lipid Panel    10. Hypothyroidism, unspecified type  Assessment & Plan:  We will assess thyroid labs today    Orders:  -     Thyroid Panel    11. Gout, unspecified cause, unspecified chronicity, unspecified site  Comments:  We will assess uric acid level today and refill allopurinol  Orders:  -     Uric Acid    12. Chronic gout involving toe of left foot without tophus, unspecified cause  Comments:  We will assess uric acid level today.  Refill allopurinol  Orders:  -     allopurinoL (ZYLOPRIM) 300 MG tablet; Take 1 tablet (300 mg total) by mouth once daily.  Dispense: 90 tablet; Refill: 1    13. Osteoporosis, unspecified osteoporosis type, unspecified pathological fracture presence  Comments:  Osteoporosis noted on recent exam.  She has never taking bisphosphonate therapy.  We will begin Fosamax as tolerated.  Orders:  -     alendronate (FOSAMAX) 70 MG tablet; Take one PO once per week on an empty stomach one hour prior to other meds or food  Dispense: 12 tablet; Refill: 2    14. Chronic kidney disease, stage 3a  Assessment & Plan:  We will assess kidney functioning today.      15. Hyperlipidemia, unspecified hyperlipidemia type  Assessment & Plan:  We will assess statin effectiveness with lab today      16. Essential hypertension  Assessment & Plan:  The current medical regimen is effective;  continue present plan and medications.             There are no Patient Instructions on file for this visit.     Health Maintenance Due   Topic Date Due    Hepatitis C Screening  Never done    TETANUS VACCINE  Never done    Shingles Vaccine (1 of 2) Never done    RSV Vaccine (Age 60+ and Pregnant patients) (1 - 1-dose 60+ series) Never done    COVID-19 Vaccine (4 - 2023-24 season) 09/01/2023    Lipid Panel  02/01/2024    Hemoglobin A1c  02/09/2024         Health Maintenance Topics with due status: Not Due       Topic Last Completion Date    Diabetes Urine Screening 08/01/2023    DEXA Scan 08/17/2023    Eye Exam 09/07/2023        Future Appointments   Date Time Provider Department Center   8/8/2024  9:00 AM AWV NURSE, [facility]   9/16/2024 10:30 AM [facility] [place]   [facility]        We will review labs and make recommendations once those results are available.  Will most likely need adjustment in diabetic management medications.  We will go ahead and initiate patient assistance paperwork for Ozempic also.    Signature:  LANDEN Shetty  [facility]  [address] [facility] [id number]  [phone]    Date of encounter: 5/21/24

## 2024-05-21 NOTE — ASSESSMENT & PLAN NOTE
Last A1c was not within goal range.  We will reassess today and make recommendations.  Would prefer that she not take sulfonylurea but this has been an ongoing issue with her due to cost of other medications.

## 2024-05-22 DIAGNOSIS — E78.5 HYPERLIPIDEMIA, UNSPECIFIED HYPERLIPIDEMIA TYPE: ICD-10-CM

## 2024-05-22 LAB
FOLATE SERPL-MCNC: 14.3 NG/ML (ref 3.1–17.5)
VIT B12 SERPL-MCNC: 269 PG/ML (ref 193–986)

## 2024-05-22 PROCEDURE — 82746 ASSAY OF FOLIC ACID SERUM: CPT | Mod: GZ,,, | Performed by: CLINICAL MEDICAL LABORATORY

## 2024-05-22 PROCEDURE — 82607 VITAMIN B-12: CPT | Mod: GZ,,, | Performed by: CLINICAL MEDICAL LABORATORY

## 2024-05-22 RX ORDER — PRAVASTATIN SODIUM 40 MG/1
40 TABLET ORAL DAILY
Qty: 90 TABLET | Refills: 1 | Status: SHIPPED | OUTPATIENT
Start: 2024-05-22

## 2024-05-22 NOTE — PROGRESS NOTES
Please see if lab can add a B12 level to specimen for diagnosis of anemia. Did she get the forms for patient assistance for ozempic?  Let her know that her A1c was 7.4 this time, which was some better, but hopefully we can get ozempic approved.

## 2024-05-22 NOTE — PROGRESS NOTES
B12 added. Forms were given to her yesterday with instructions to bring back. Unable to reach patient with results left vm

## 2024-06-04 DIAGNOSIS — E03.9 HYPOTHYROIDISM, UNSPECIFIED TYPE: ICD-10-CM

## 2024-06-04 RX ORDER — LEVOTHYROXINE SODIUM 25 UG/1
25 TABLET ORAL
Qty: 90 TABLET | Refills: 1 | Status: SHIPPED | OUTPATIENT
Start: 2024-06-04 | End: 2024-12-01

## 2024-07-25 DIAGNOSIS — E78.5 HYPERLIPIDEMIA, UNSPECIFIED HYPERLIPIDEMIA TYPE: ICD-10-CM

## 2024-07-25 RX ORDER — GEMFIBROZIL 600 MG/1
600 TABLET, FILM COATED ORAL DAILY
Qty: 90 TABLET | Refills: 1 | Status: SHIPPED | OUTPATIENT
Start: 2024-07-25

## 2024-09-16 ENCOUNTER — OFFICE VISIT (OUTPATIENT)
Dept: FAMILY MEDICINE | Facility: CLINIC | Age: 79
End: 2024-09-16
Payer: MEDICARE

## 2024-09-16 VITALS
DIASTOLIC BLOOD PRESSURE: 70 MMHG | WEIGHT: 163 LBS | HEART RATE: 51 BPM | HEIGHT: 63 IN | RESPIRATION RATE: 18 BRPM | SYSTOLIC BLOOD PRESSURE: 130 MMHG | OXYGEN SATURATION: 98 % | BODY MASS INDEX: 28.88 KG/M2 | TEMPERATURE: 97 F

## 2024-09-16 DIAGNOSIS — D64.9 ANEMIA, UNSPECIFIED TYPE: ICD-10-CM

## 2024-09-16 DIAGNOSIS — E11.22 TYPE 2 DIABETES MELLITUS WITH STAGE 3A CHRONIC KIDNEY DISEASE, WITHOUT LONG-TERM CURRENT USE OF INSULIN: Primary | ICD-10-CM

## 2024-09-16 DIAGNOSIS — Z53.20 COLON CANCER SCREENING DECLINED: ICD-10-CM

## 2024-09-16 DIAGNOSIS — I10 ESSENTIAL HYPERTENSION: ICD-10-CM

## 2024-09-16 DIAGNOSIS — M1A.9XX0 CHRONIC GOUT INVOLVING TOE OF LEFT FOOT WITHOUT TOPHUS, UNSPECIFIED CAUSE: ICD-10-CM

## 2024-09-16 DIAGNOSIS — E11.42 POLYNEUROPATHY DUE TO TYPE 2 DIABETES MELLITUS: ICD-10-CM

## 2024-09-16 DIAGNOSIS — E03.9 HYPOTHYROIDISM, UNSPECIFIED TYPE: ICD-10-CM

## 2024-09-16 DIAGNOSIS — N18.31 TYPE 2 DIABETES MELLITUS WITH STAGE 3A CHRONIC KIDNEY DISEASE, WITHOUT LONG-TERM CURRENT USE OF INSULIN: Primary | ICD-10-CM

## 2024-09-16 DIAGNOSIS — E78.5 HYPERLIPIDEMIA, UNSPECIFIED HYPERLIPIDEMIA TYPE: ICD-10-CM

## 2024-09-16 LAB
ALBUMIN SERPL BCP-MCNC: 3.6 G/DL (ref 3.5–5)
ALBUMIN/GLOB SERPL: 1.1 {RATIO}
ALP SERPL-CCNC: 98 U/L (ref 55–142)
ALT SERPL W P-5'-P-CCNC: 12 U/L (ref 13–56)
ANION GAP SERPL CALCULATED.3IONS-SCNC: 13 MMOL/L (ref 7–16)
AST SERPL W P-5'-P-CCNC: 15 U/L (ref 15–37)
BASOPHILS # BLD AUTO: 0.05 K/UL (ref 0–0.2)
BASOPHILS NFR BLD AUTO: 0.6 % (ref 0–1)
BILIRUB SERPL-MCNC: 0.3 MG/DL (ref ?–1.2)
BUN SERPL-MCNC: 29 MG/DL (ref 7–18)
BUN/CREAT SERPL: 22 (ref 6–20)
CALCIUM SERPL-MCNC: 9.3 MG/DL (ref 8.5–10.1)
CHLORIDE SERPL-SCNC: 110 MMOL/L (ref 98–107)
CHOLEST SERPL-MCNC: 130 MG/DL (ref 0–200)
CHOLEST/HDLC SERPL: 3.9 {RATIO}
CO2 SERPL-SCNC: 21 MMOL/L (ref 21–32)
CREAT SERPL-MCNC: 1.31 MG/DL (ref 0.55–1.02)
DIFFERENTIAL METHOD BLD: ABNORMAL
EGFR (NO RACE VARIABLE) (RUSH/TITUS): 42 ML/MIN/1.73M2
EOSINOPHIL # BLD AUTO: 0.17 K/UL (ref 0–0.5)
EOSINOPHIL NFR BLD AUTO: 1.9 % (ref 1–4)
ERYTHROCYTE [DISTWIDTH] IN BLOOD BY AUTOMATED COUNT: 16.9 % (ref 11.5–14.5)
GLOBULIN SER-MCNC: 3.3 G/DL (ref 2–4)
GLUCOSE SERPL-MCNC: 153 MG/DL (ref 74–106)
HCT VFR BLD AUTO: 32.6 % (ref 38–47)
HDLC SERPL-MCNC: 33 MG/DL (ref 40–60)
HGB BLD-MCNC: 9.7 G/DL (ref 12–16)
IMM GRANULOCYTES # BLD AUTO: 0.04 K/UL (ref 0–0.04)
IMM GRANULOCYTES NFR BLD: 0.5 % (ref 0–0.4)
LYMPHOCYTES # BLD AUTO: 1.21 K/UL (ref 1–4.8)
LYMPHOCYTES NFR BLD AUTO: 13.8 % (ref 27–41)
MCH RBC QN AUTO: 25.8 PG (ref 27–31)
MCHC RBC AUTO-ENTMCNC: 29.8 G/DL (ref 32–36)
MCV RBC AUTO: 86.7 FL (ref 80–96)
MONOCYTES # BLD AUTO: 0.64 K/UL (ref 0–0.8)
MONOCYTES NFR BLD AUTO: 7.3 % (ref 2–6)
MPC BLD CALC-MCNC: 13.3 FL (ref 9.4–12.4)
NEUTROPHILS # BLD AUTO: 6.68 K/UL (ref 1.8–7.7)
NEUTROPHILS NFR BLD AUTO: 75.9 % (ref 53–65)
NONHDLC SERPL-MCNC: 97 MG/DL
NRBC # BLD AUTO: 0 X10E3/UL
NRBC, AUTO (.00): 0 %
PLATELET # BLD AUTO: 237 K/UL (ref 150–400)
PLATELET MORPHOLOGY: ABNORMAL
POTASSIUM SERPL-SCNC: 4.5 MMOL/L (ref 3.5–5.1)
PROT SERPL-MCNC: 6.9 G/DL (ref 6.4–8.2)
RBC # BLD AUTO: 3.76 M/UL (ref 4.2–5.4)
RBC MORPH BLD: NORMAL
SODIUM SERPL-SCNC: 139 MMOL/L (ref 136–145)
T4 FREE SERPL-MCNC: 1.14 NG/DL (ref 0.76–1.46)
TRIGL SERPL-MCNC: 476 MG/DL (ref 35–150)
TSH SERPL DL<=0.005 MIU/L-ACNC: 2.39 UIU/ML (ref 0.36–3.74)
URATE SERPL-MCNC: 6.1 MG/DL (ref 2.6–6)
VLDLC SERPL-MCNC: ABNORMAL MG/DL
WBC # BLD AUTO: 8.79 K/UL (ref 4.5–11)

## 2024-09-16 PROCEDURE — 99213 OFFICE O/P EST LOW 20 MIN: CPT | Mod: ,,, | Performed by: NURSE PRACTITIONER

## 2024-09-16 PROCEDURE — 84439 ASSAY OF FREE THYROXINE: CPT | Mod: ,,, | Performed by: CLINICAL MEDICAL LABORATORY

## 2024-09-16 PROCEDURE — 84443 ASSAY THYROID STIM HORMONE: CPT | Mod: ,,, | Performed by: CLINICAL MEDICAL LABORATORY

## 2024-09-16 PROCEDURE — 85025 COMPLETE CBC W/AUTO DIFF WBC: CPT | Mod: ,,, | Performed by: CLINICAL MEDICAL LABORATORY

## 2024-09-16 PROCEDURE — 80053 COMPREHEN METABOLIC PANEL: CPT | Mod: ,,, | Performed by: CLINICAL MEDICAL LABORATORY

## 2024-09-16 PROCEDURE — 36415 COLL VENOUS BLD VENIPUNCTURE: CPT | Mod: ,,, | Performed by: NURSE PRACTITIONER

## 2024-09-16 PROCEDURE — 80061 LIPID PANEL: CPT | Mod: ,,, | Performed by: CLINICAL MEDICAL LABORATORY

## 2024-09-16 PROCEDURE — 84550 ASSAY OF BLOOD/URIC ACID: CPT | Mod: ,,, | Performed by: CLINICAL MEDICAL LABORATORY

## 2024-09-16 RX ORDER — FLUTICASONE PROPIONATE 50 MCG
2 SPRAY, SUSPENSION (ML) NASAL DAILY
Qty: 16 G | Refills: 5 | Status: SHIPPED | OUTPATIENT
Start: 2024-09-16

## 2024-09-16 NOTE — PROGRESS NOTES
Subjective     Patient ID: Frannie Wetzel is a 79 y.o. female.    Chief Complaint: Hypertension (Followup HTN, Hyperlipidemia, GERD, Gout, and DM)    Patient presents today for routine check up, labs and medication refills. No new complaints or concerns today. Tolerating medications without side effects or difficulty obtaining due to financial constraints. She had a recent EGD two weeks ago with Dr Miller. She is in need of a colonoscopy but refuses that at this time. She had some anemia last check but reports that she does not plan to address that issue. Reports glucose was 150 this am at home. Has planned follow up appt with echocardiogram per Dr Jolley next month.           Review of Systems   Constitutional:  Negative for fatigue and unexpected weight change.   Respiratory:  Negative for cough and chest tightness.    Cardiovascular:  Negative for chest pain, palpitations and leg swelling.   Gastrointestinal:  Negative for blood in stool, change in bowel habit and reflux.   Endocrine: Negative for polydipsia and polyuria.   Genitourinary:  Negative for dysuria.   Musculoskeletal:  Negative for arthralgias and gait problem.   Neurological:  Negative for weakness.   Psychiatric/Behavioral:  Negative for sleep disturbance.         Denies depression  Denies anxiety          Objective     Physical Exam  Vitals and nursing note reviewed.   Constitutional:       General: She is not in acute distress.     Appearance: Normal appearance. She is normal weight.   HENT:      Head: Normocephalic.   Eyes:      General: No scleral icterus.     Pupils: Pupils are equal, round, and reactive to light.   Cardiovascular:      Rate and Rhythm: Normal rate and regular rhythm.      Pulses: Normal pulses.      Heart sounds: Normal heart sounds.   Pulmonary:      Effort: Pulmonary effort is normal.      Breath sounds: Normal breath sounds.   Abdominal:      General: Abdomen is flat. Bowel sounds are normal.      Palpations: Abdomen is  soft.   Musculoskeletal:      Cervical back: Normal range of motion.      Right lower leg: No edema.      Left lower leg: No edema.   Skin:     General: Skin is warm and dry.      Capillary Refill: Capillary refill takes 2 to 3 seconds.   Neurological:      General: No focal deficit present.      Mental Status: She is alert and oriented to person, place, and time. Mental status is at baseline.      Gait: Gait normal.   Psychiatric:         Mood and Affect: Mood normal.         Behavior: Behavior normal.         Thought Content: Thought content normal.         Judgment: Judgment normal.            Assessment and Plan     1. Type 2 diabetes mellitus with stage 3a chronic kidney disease, without long-term current use of insulin  -     Thyroid Panel  -     Lipid Panel  -     Comprehensive Metabolic Panel  -     Uric Acid  -     CBC Auto Differential    2. Essential hypertension  -     Thyroid Panel  -     Lipid Panel  -     Comprehensive Metabolic Panel  -     Uric Acid  -     CBC Auto Differential    3. Chronic gout involving toe of left foot without tophus, unspecified cause  -     Thyroid Panel  -     Lipid Panel  -     Comprehensive Metabolic Panel  -     Uric Acid  -     CBC Auto Differential    4. Polyneuropathy due to type 2 diabetes mellitus  -     Thyroid Panel  -     Lipid Panel  -     Comprehensive Metabolic Panel  -     Uric Acid  -     CBC Auto Differential    5. Hypothyroidism, unspecified type  -     Thyroid Panel  -     Lipid Panel  -     Comprehensive Metabolic Panel  -     Uric Acid  -     CBC Auto Differential    6. Hyperlipidemia, unspecified hyperlipidemia type  -     Thyroid Panel  -     Lipid Panel  -     Comprehensive Metabolic Panel  -     Uric Acid  -     CBC Auto Differential    7. Anemia, unspecified type  -     CBC Auto Differential    8. Colon cancer screening declined    Other orders  -     fluticasone propionate (FLONASE) 50 mcg/actuation nasal spray; 2 sprays (100 mcg total) by Each  Nostril route once daily.  Dispense: 16 g; Refill: 5        I will review labs once available and make further recommendations.  We will see her back in 6 months or sooner if indicated after labs are reviewed.         No follow-ups on file.

## 2024-09-17 DIAGNOSIS — E78.5 HYPERLIPIDEMIA, UNSPECIFIED HYPERLIPIDEMIA TYPE: ICD-10-CM

## 2024-09-17 RX ORDER — GEMFIBROZIL 600 MG/1
600 TABLET, FILM COATED ORAL DAILY
Qty: 90 TABLET | Refills: 1 | Status: SHIPPED | OUTPATIENT
Start: 2024-09-17

## 2024-09-19 DIAGNOSIS — E11.22 TYPE 2 DIABETES MELLITUS WITH STAGE 3A CHRONIC KIDNEY DISEASE, WITHOUT LONG-TERM CURRENT USE OF INSULIN: Chronic | ICD-10-CM

## 2024-09-19 DIAGNOSIS — N18.31 TYPE 2 DIABETES MELLITUS WITH STAGE 3A CHRONIC KIDNEY DISEASE, WITHOUT LONG-TERM CURRENT USE OF INSULIN: Chronic | ICD-10-CM

## 2024-09-19 RX ORDER — METFORMIN HYDROCHLORIDE EXTENDED-RELEASE TABLETS 500 MG/1
TABLET, FILM COATED, EXTENDED RELEASE ORAL
Qty: 180 TABLET | Refills: 2 | Status: SHIPPED | OUTPATIENT
Start: 2024-09-19

## 2024-09-19 RX ORDER — GLIMEPIRIDE 1 MG/1
0.5 TABLET ORAL
Qty: 45 TABLET | Refills: 3 | Status: SHIPPED | OUTPATIENT
Start: 2024-09-19 | End: 2025-09-19

## 2024-09-24 LAB — % HEMOGLOBIN A1C: 7.7

## 2024-10-21 ENCOUNTER — PATIENT MESSAGE (OUTPATIENT)
Dept: ADMINISTRATIVE | Facility: HOSPITAL | Age: 79
End: 2024-10-21

## 2024-10-22 ENCOUNTER — OFFICE VISIT (OUTPATIENT)
Dept: FAMILY MEDICINE | Facility: CLINIC | Age: 79
End: 2024-10-22
Payer: MEDICARE

## 2024-10-22 VITALS
HEIGHT: 63 IN | WEIGHT: 160 LBS | OXYGEN SATURATION: 98 % | DIASTOLIC BLOOD PRESSURE: 60 MMHG | RESPIRATION RATE: 18 BRPM | SYSTOLIC BLOOD PRESSURE: 112 MMHG | HEART RATE: 80 BPM | BODY MASS INDEX: 28.35 KG/M2 | TEMPERATURE: 97 F

## 2024-10-22 DIAGNOSIS — S90.422A BLISTER OF LEFT GREAT TOE, INITIAL ENCOUNTER: Primary | ICD-10-CM

## 2024-10-22 PROCEDURE — 99213 OFFICE O/P EST LOW 20 MIN: CPT | Mod: ,,, | Performed by: NURSE PRACTITIONER

## 2024-10-22 RX ORDER — NAPROXEN SODIUM 220 MG/1
81 TABLET, FILM COATED ORAL DAILY
COMMUNITY

## 2024-10-22 RX ORDER — METOPROLOL SUCCINATE 25 MG/1
25 TABLET, EXTENDED RELEASE ORAL DAILY
COMMUNITY

## 2024-10-22 RX ORDER — MUPIROCIN 20 MG/G
OINTMENT TOPICAL 3 TIMES DAILY
Qty: 15 G | Refills: 0 | Status: SHIPPED | OUTPATIENT
Start: 2024-10-22

## 2024-10-22 RX ORDER — SULFAMETHOXAZOLE AND TRIMETHOPRIM 800; 160 MG/1; MG/1
1 TABLET ORAL 2 TIMES DAILY
Qty: 10 TABLET | Refills: 0 | Status: SHIPPED | OUTPATIENT
Start: 2024-10-22 | End: 2024-10-22

## 2024-10-22 RX ORDER — ISOSORBIDE MONONITRATE 30 MG/1
30 TABLET, EXTENDED RELEASE ORAL DAILY
COMMUNITY

## 2024-10-22 RX ORDER — SULFAMETHOXAZOLE AND TRIMETHOPRIM 800; 160 MG/1; MG/1
1 TABLET ORAL 2 TIMES DAILY
Qty: 10 TABLET | Refills: 0 | Status: SHIPPED | OUTPATIENT
Start: 2024-10-22

## 2024-10-22 RX ORDER — AMLODIPINE BESYLATE 5 MG/1
5 TABLET ORAL DAILY
COMMUNITY

## 2024-10-22 RX ORDER — DAPAGLIFLOZIN 10 MG/1
10 TABLET, FILM COATED ORAL DAILY
COMMUNITY

## 2024-10-22 RX ORDER — ATORVASTATIN CALCIUM 80 MG/1
80 TABLET, FILM COATED ORAL DAILY
COMMUNITY

## 2024-10-22 NOTE — PROGRESS NOTES
Subjective     Patient ID: Frannie Wetzel is a 79 y.o. female.    Chief Complaint: left foot blister (Left foot blister, concerned about infection )    Patient presents today for complaints of blister to left great toe base.  She reports that she believes a she may have rubbed the toe causing a blister.  Her spouse used a needle to puncture the blister twice to facilitate drainage of clear substance.  Since that time she has noted some erythema to the area and reports that is becoming slightly tender to touch.  There has been no associated fever or purulent drainage from the area.  She has been applying Neosporin without relief of symptoms.      Review of Systems   Constitutional:  Negative for fever.   Endocrine: Negative for polydipsia and polyuria.          Objective     Physical Exam  Vitals and nursing note reviewed.   Constitutional:       Appearance: Normal appearance.   HENT:      Head: Normocephalic.   Eyes:      General: No scleral icterus.     Pupils: Pupils are equal, round, and reactive to light.   Cardiovascular:      Rate and Rhythm: Normal rate and regular rhythm.   Pulmonary:      Effort: Pulmonary effort is normal.      Breath sounds: Normal breath sounds.   Abdominal:      Palpations: Abdomen is soft.   Musculoskeletal:         General: No swelling, tenderness, deformity or signs of injury.      Cervical back: Normal range of motion.      Right lower leg: No edema.      Left lower leg: No edema.   Skin:     General: Skin is warm and dry.      Capillary Refill: Capillary refill takes 2 to 3 seconds.      Comments: There is a small open area to left great toe region over a bunion that is superficially open to air. No purulent drainage present. Cap refill WNL. Digit warm to touch. Pedal pulses intact. Mild erythema present to area.    Neurological:      General: No focal deficit present.      Mental Status: She is alert and oriented to person, place, and time. Mental status is at baseline.      Gait:  Gait normal.   Psychiatric:         Mood and Affect: Mood normal.         Behavior: Behavior normal.         Thought Content: Thought content normal.         Judgment: Judgment normal.            Assessment and Plan     1. Blister of left great toe, initial encounter  -     Discontinue: sulfamethoxazole-trimethoprim 800-160mg (BACTRIM DS) 800-160 mg Tab; Take 1 tablet by mouth 2 (two) times daily.  Dispense: 10 tablet; Refill: 0  -     sulfamethoxazole-trimethoprim 800-160mg (BACTRIM DS) 800-160 mg Tab; Take 1 tablet by mouth 2 (two) times daily.  Dispense: 10 tablet; Refill: 0  -     mupirocin (BACTROBAN) 2 % ointment; Apply topically 3 (three) times daily.  Dispense: 15 g; Refill: 0        Clean with warm dial soap and water b.i.d. and apply ointment as prescribed.  Return to clinic for new or worsening symptoms.         No follow-ups on file.

## 2024-10-23 ENCOUNTER — PATIENT OUTREACH (OUTPATIENT)
Facility: HOSPITAL | Age: 79
End: 2024-10-23
Payer: MEDICARE

## 2024-10-23 NOTE — LETTER
AUTHORIZATION FOR RELEASE OF   CONFIDENTIAL INFORMATION    Dear Dr. Aguilar,    We are seeing Frannie Wetzel, date of birth 1945, in the clinic at [facility]. Westley Lim IV, DO is the patient's PCP. Frannie Wetzel has an outstanding lab/procedure at the time we reviewed her chart. In order to help keep her health information updated, she has authorized us to request the following medical record(s):                                              ( X)  EYE EXAM                                                      Please fax records to Celine Su LPN, Care Coordinator at [phone].      If you have any questions, please contact Celine at [phone]           Patient Name: Frannie Wetzel  : 1945  Patient Phone #: [phone]            Frannie Wetzel  MRN: [id number]  : 1945  Age: 78 y.o.  Sex: female         Patient/Legal Guardian Signature  This signature was collected at 2024    pd       _______________________________   Printed Name/Relationship to Patient      Consent for Examination and Treatment: I hereby authorize the providers and employees of [facility] ([facility]) to provide medical treatment/services which includes, but is not limited to, performing and administering tests and diagnostic procedures that are deemed necessary, including, but not limited to, imaging examinations, blood tests and other laboratory procedures as may be required by the hospital, clinic, or may be ordered by my physician(s) or persons working under the general and/or special instructions of my physician(s).      I understand and agree that this consent covers all authorized persons, including but not limited to physicians, residents, nurse practitioners, physicians' assistants, specialists, consultants, student nurses, and independently contracted physicians, who are called upon by the physician in charge, to carry out the diagnostic procedures and medical or surgical  treatment.     I hereby authorize [facility] to retain or dispose of any specimens or tissue, should there be such remaining from any test or procedure.     I hereby authorize and give consent for [facility] providers and employees to take photographs, images or videotapes of such diagnostic, surgical or treatment procedures of Patient as may be required by [facility] or as may be ordered by a physician. I further acknowledge and agree that [facility] may use cameras or other devices for patient monitoring.     I am aware that the practice of medicine is not an exact science, and I acknowledge that no guarantees have been made to me as to the outcome of any tests, procedures or treatment.     Authorization for Release of Information: I understand that my insurance company and/or their agents may need information necessary to make determinations about payment/reimbursement. I hereby provide authorization to release to all insurance companies, their successors, assignees, other parties with whom they may have contracted, or others acting on their behalf, that are involved with payment for any hospital and/or clinic charges incurred by the patient, any information that they request and deem necessary for payment/reimbursement, and/or quality review.  I further authorize the release of my health information to physicians or other health care practitioners on staff who are involved in my health care now and in the future, and to other health care providers, entities, or institutions for the purpose of my continued care and treatment, including referrals.     REGISTRATION AUTHORIZATION  Form No. 15086 (Rev. 7/13/2022)       Medicare Patient's Certification and Authorization to Release Information and Payment Request:  I certify that the information given by me in applying for payment under Title XVIII of the Social Security Act is correct. I authorize any willett of medical or other information about me to release to the [facility], or its intermediaries or carriers, any information needed for this or a related Medicare claim. I request that payment of authorized benefits be made on my behalf.     Assignment of Insurance Benefits:   I hereby authorize any and all insurance companies, health plans, defined   benefit plans, health insurers or any entity that is or may be responsible for payment of my medical expenses to pay all hospital and medical benefits now due, and to become due and payable to me under any hospital benefits, sick benefits, injury benefits or any other benefit for services rendered to me, including Major Medical Benefits, direct to [facility] and all independently contracted physicians. I assign any and all rights that I may have against any and all insurance companies, health plans, defined benefit plans, health insurers or any entity that is or may be responsible for payment of my medical expenses, including, but not limited to any right to appeal a denial of a claim, any right to bring any action, lawsuit, administrative proceeding, or other cause of action on my behalf. I specifically assign my right to pursue litigation against any and all insurance companies, health plans, defined benefit plans, health insurers or any entity that is or may be responsible for payment of my medical expenses based upon a refusal to pay charges.            E. Valuables: It is understood and agreed that [facility] is not liable for the damage to or loss of any money, jewelry,   documents, dentures, eye glasses, hearing aids, prosthetics, or other property of value.     F. Computer Equipment: I understand and agree that should I choose to use computer equipment owned by [facility] or if I choose to access the Internet via [facility]s network, I do so at my own risk. [facility] is not responsible for any damage to my computer equipment or to any damages of any type that might arise from my loss of equipment or data.     G. Acceptance  of Financial Responsibility:  I agree that in consideration of the services and   supplies that have been   or will be furnished to the patient, I am hereby obligated to pay all charges made for or on the account of the patient according to the standard rates (in effect at the time the services and supplies are delivered) established by [facility], including its Patient Financial Assistance Policy to the extent it is applicable. I understand that I am responsible for all charges, or portions thereof, not covered by insurance or other sources. Patient refunds will be distributed only after balances at all [facility] facilities are paid.     H. Communication Authorization:  I hereby authorize [facility] and its representatives, along with any billing service   or  who may work on their behalf, to contact me on   my cell phone and/or home phone using pre- recorded messages, artificial voice messages, automatic telephone dialing devices or other computer assisted technology, or by electronic      mail, text messaging, or by any other form of electronic communication. This includes, but is not limited to, appointment reminders, yearly physical exam reminders, preventive care reminders, patient campaigns, welcome calls, and calls about account balances on my account or any account on which I am listed as a guarantor. I understand I have the right to opt out of these communications at any time.      Relationship  Between  Facility and  Provider:      I understand that some, but not all, providers furnishing services to the patient are not employees or agents of [facility]. The patient is under the care and supervision of his/her attending physician, and it is the responsibility of the facility and its nursing staff to carry out the instructions of such physicians. It is the responsibility of the patient's physician/designee to obtain the patient's informed consent, when required, for medical or surgical treatment,  special diagnostic or therapeutic procedures, or hospital services rendered for the patient under the special instructions of the physician/designee.     REGISTRATION AUTHORIZATION  Form No. 21944 (Rev. 7/13/2022)      Notice of Privacy Practices: I acknowledge I have received a copy of [facility]'s Notice of Privacy Practices.     Facility  Directory: I have discussed with the organization my desire to be either included or excluded  in the facility directory in the event of my being an inpatient at an [facility] facility. I understand that if my choice is to opt-out of being identified in the facility directory that the facility will not provide any information about me such as my condition (e.g. fair, stable, etc.) or my location in the facility (e.g., room number, department).     Immunizations: [facility] shares immunization information with state sponsored health departments to help you and your doctor keep track of your immunization records. By signing, you consent to have this information shared with the health department in your state:                                [place] - LINKS ([place] Immunization Network for Kids Statewide)                                [place] - MIIX ([place] Immunization Information eXchange)                                [place] - ImmPRINT (Immunization Patient Registry with Integrated Technology)     TERM: This authorization is valid for this and subsequent care/treatment I receive at [facility] and will remain valid unless/until revoked in writing by me.     [facility]: As used in this document, [facility] means all [facility] owned and managed facilities, including, but not limited to, all health centers, surgery centers, clinics, urgent care centers, and hospitals.         [facility] complies with applicable Federal civil rights laws and does not discriminate on the basis of race, color, national origin, age, disability, or sex.  ATENCIÓN: dominic clay  español, tiene a doyle disposición servicios gratuitos de asistencia lingüística. Javier al [phone].  CHÚ Ý: N?u b?n nói Ti?ng Vi?t, có các d?ch v? h? tr? ngôn ng? mi?n phí dành cho b?n. G?i s? [phone].        REGISTRATION AUTHORIZATION  Form No. 02664 (Rev. 7/13/2022)

## 2024-10-23 NOTE — PROGRESS NOTES
Population Health Chart Review & Patient Outreach Details    Updates Requested / Reviewed:  [x]  Care Team Updated  []  Care Everywhere Updated & Reviewed  []  [facility] Reviewed  []   Reviewed  []  MIIX Reviewed    Health Maintenance Topics Addressed and Outreach Outcomes / Actions Taken:           HbA1c & Other Labs []  Overdue Lab(s) Ordered    []  Overdue Lab(s) Scheduled    [x]  External Records requested    []  Primary Care Follow Up Visit Scheduled     []  Reminded Patient to Complete A1c Home Test    []  Patient Declined Scheduling Labs or Will Call Back to Schedule    []  Patient Will Schedule with External Provider / Order Routed, & Care Team Updated if Applicable                  Diabetic Eye Exam []  Eye Exam Screening Order Placed    []  Eye Camera Scheduled or Optometry/Ophthalmology Referral Placed    [x]  External Records Requested & Care Team Updated if Applicable    []  External Records Uploaded, Care Team Updated, & History Updated if Applicable    []  Patient Declined Scheduling Eye Exam    []  Patient Will Schedule with External Provider & Care Team Updated if Applicable

## 2024-10-23 NOTE — LETTER
AUTHORIZATION FOR RELEASE OF   CONFIDENTIAL INFORMATION    Dear Dr. Jolley,    We are seeing Frannie Wetzel, date of birth 1945, in the clinic at [facility]. Westley Lim IV, DO is the patient's PCP. Frannie Wetzel has an outstanding lab/procedure at the time we reviewed her chart. In order to help keep her health information updated, she has authorized us to request the following medical record(s):                                                 ( X ) MOST RECENT  LAB RESULTS         Please fax records to Celine Su LPN, Care Coordinator at [phone].      If you have any questions, please contact Celine at [phone]           Patient Name: Frannie Wetzel  : 1945  Patient Phone #: [phone]            Frannie Wetzel  MRN: [id number]  : 1945  Age: 78 y.o.  Sex: female         Patient/Legal Guardian Signature  This signature was collected at 2024    pd       _______________________________   Printed Name/Relationship to Patient      Consent for Examination and Treatment: I hereby authorize the providers and employees of [facility] ([facility]) to provide medical treatment/services which includes, but is not limited to, performing and administering tests and diagnostic procedures that are deemed necessary, including, but not limited to, imaging examinations, blood tests and other laboratory procedures as may be required by the hospital, clinic, or may be ordered by my physician(s) or persons working under the general and/or special instructions of my physician(s).      I understand and agree that this consent covers all authorized persons, including but not limited to physicians, residents, nurse practitioners, physicians' assistants, specialists, consultants, student nurses, and independently contracted physicians, who are called upon by the physician in charge, to carry out the diagnostic procedures and medical or surgical treatment.     I hereby authorize  [facility] to retain or dispose of any specimens or tissue, should there be such remaining from any test or procedure.     I hereby authorize and give consent for [facility] providers and employees to take photographs, images or videotapes of such diagnostic, surgical or treatment procedures of Patient as may be required by [facility] or as may be ordered by a physician. I further acknowledge and agree that [facility] may use cameras or other devices for patient monitoring.     I am aware that the practice of medicine is not an exact science, and I acknowledge that no guarantees have been made to me as to the outcome of any tests, procedures or treatment.     Authorization for Release of Information: I understand that my insurance company and/or their agents may need information necessary to make determinations about payment/reimbursement. I hereby provide authorization to release to all insurance companies, their successors, assignees, other parties with whom they may have contracted, or others acting on their behalf, that are involved with payment for any hospital and/or clinic charges incurred by the patient, any information that they request and deem necessary for payment/reimbursement, and/or quality review.  I further authorize the release of my health information to physicians or other health care practitioners on staff who are involved in my health care now and in the future, and to other health care providers, entities, or institutions for the purpose of my continued care and treatment, including referrals.     REGISTRATION AUTHORIZATION  Form No. 37697 (Rev. 7/13/2022)       Medicare Patient's Certification and Authorization to Release Information and Payment Request:  I certify that the information given by me in applying for payment under Title XVIII of the Social Security Act is correct. I authorize any willett of medical or other information about me to release to the Social SecurityAdministration, or its  intermediaries or carriers, any information needed for this or a related Medicare claim. I request that payment of authorized benefits be made on my behalf.     Assignment of Insurance Benefits:   I hereby authorize any and all insurance companies, health plans, defined   benefit plans, health insurers or any entity that is or may be responsible for payment of my medical expenses to pay all hospital and medical benefits now due, and to become due and payable to me under any hospital benefits, sick benefits, injury benefits or any other benefit for services rendered to me, including Major Medical Benefits, direct to [facility] and all independently contracted physicians. I assign any and all rights that I may have against any and all insurance companies, health plans, defined benefit plans, health insurers or any entity that is or may be responsible for payment of my medical expenses, including, but not limited to any right to appeal a denial of a claim, any right to bring any action, lawsuit, administrative proceeding, or other cause of action on my behalf. I specifically assign my right to pursue litigation against any and all insurance companies, health plans, defined benefit plans, health insurers or any entity that is or may be responsible for payment of my medical expenses based upon a refusal to pay charges.            E. Valuables: It is understood and agreed that [facility] is not liable for the damage to or loss of any money, jewelry,   documents, dentures, eye glasses, hearing aids, prosthetics, or other property of value.     F. Computer Equipment: I understand and agree that should I choose to use computer equipment owned by [facility] or if I choose to access the Internet via [facility]s network, I do so at my own risk. [facility] is not responsible for any damage to my computer equipment or to any damages of any type that might arise from my loss of equipment or data.     G. Acceptance of Financial Responsibility:   I agree that in consideration of the services and   supplies that have been   or will be furnished to the patient, I am hereby obligated to pay all charges made for or on the account of the patient according to the standard rates (in effect at the time the services and supplies are delivered) established by [facility], including its Patient Financial Assistance Policy to the extent it is applicable. I understand that I am responsible for all charges, or portions thereof, not covered by insurance or other sources. Patient refunds will be distributed only after balances at all [facility] facilities are paid.     H. Communication Authorization:  I hereby authorize [facility] and its representatives, along with any billing service   or  who may work on their behalf, to contact me on   my cell phone and/or home phone using pre- recorded messages, artificial voice messages, automatic telephone dialing devices or other computer assisted technology, or by electronic      mail, text messaging, or by any other form of electronic communication. This includes, but is not limited to, appointment reminders, yearly physical exam reminders, preventive care reminders, patient campaigns, welcome calls, and calls about account balances on my account or any account on which I am listed as a guarantor. I understand I have the right to opt out of these communications at any time.      Relationship  Between  Facility and  Provider:      I understand that some, but not all, providers furnishing services to the patient are not employees or agents of [facility]. The patient is under the care and supervision of his/her attending physician, and it is the responsibility of the facility and its nursing staff to carry out the instructions of such physicians. It is the responsibility of the patient's physician/designee to obtain the patient's informed consent, when required, for medical or surgical treatment, special diagnostic or therapeutic  procedures, or hospital services rendered for the patient under the special instructions of the physician/designee.     REGISTRATION AUTHORIZATION  Form No. 11086 (Rev. 7/13/2022)      Notice of Privacy Practices: I acknowledge I have received a copy of [facility]'s Notice of Privacy Practices.     Facility  Directory: I have discussed with the organization my desire to be either included or excluded  in the facility directory in the event of my being an inpatient at an [facility] facility. I understand that if my choice is to opt-out of being identified in the facility directory that the facility will not provide any information about me such as my condition (e.g. fair, stable, etc.) or my location in the facility (e.g., room number, department).     Immunizations: [facility] shares immunization information with state sponsored health departments to help you and your doctor keep track of your immunization records. By signing, you consent to have this information shared with the health department in your state:                                [place] - LINKS ([place] Immunization Network for Kids Statewide)                                [place] - MIIX ([place] Immunization Information eXchange)                                [place] - ImmPRINT (Immunization Patient Registry with Integrated Technology)     TERM: This authorization is valid for this and subsequent care/treatment I receive at [facility] and will remain valid unless/until revoked in writing by me.     [facility]: As used in this document, [facility] means all [facility] owned and managed facilities, including, but not limited to, all health centers, surgery centers, clinics, urgent care centers, and hospitals.         [facility] complies with applicable Federal civil rights laws and does not discriminate on the basis of race, color, national origin, age, disability, or sex.  ATENCIÓN: si kina holt a doyle disposición  servicios gratuitos de asistencia lingüística. Javier spear [phone].  COLT Ý: N?u b?n nói Ti?ng Vi?t, có các d?ch v? h? tr? ngôn ng? mi?n phí dành cho b?n. G?i s? [phone].        REGISTRATION AUTHORIZATION  Form No. 44663 (Rev. 7/13/2022)

## 2024-10-25 ENCOUNTER — PATIENT OUTREACH (OUTPATIENT)
Facility: HOSPITAL | Age: 79
End: 2024-10-25
Payer: MEDICARE

## 2024-10-25 NOTE — PROGRESS NOTES
HbA1c & Other Labs []  Overdue Lab(s) Ordered    []  Overdue Lab(s) Scheduled    [x]  External Records Uploaded & Care Team Updated if Applicable    []  Primary Care Follow Up Visit Scheduled     []  Reminded Patient to Complete A1c Home Test    []  Patient Declined Scheduling Labs or Will Call Back to Schedule    []  Patient Will Schedule with External Provider / Order Routed, & Care Team Updated if Applicable

## 2024-11-03 LAB
LEFT EYE DM RETINOPATHY: NEGATIVE
RIGHT EYE DM RETINOPATHY: NEGATIVE

## 2024-11-04 ENCOUNTER — PATIENT OUTREACH (OUTPATIENT)
Facility: HOSPITAL | Age: 79
End: 2024-11-04
Payer: MEDICARE

## 2024-11-13 DIAGNOSIS — E78.5 HYPERLIPIDEMIA, UNSPECIFIED HYPERLIPIDEMIA TYPE: ICD-10-CM

## 2024-11-13 RX ORDER — PRAVASTATIN SODIUM 40 MG/1
40 TABLET ORAL
Qty: 90 TABLET | Refills: 1 | Status: SHIPPED | OUTPATIENT
Start: 2024-11-13

## 2024-11-25 ENCOUNTER — PATIENT OUTREACH (OUTPATIENT)
Dept: ADMINISTRATIVE | Facility: CLINIC | Age: 79
End: 2024-11-25

## 2024-11-26 ENCOUNTER — OFFICE VISIT (OUTPATIENT)
Dept: FAMILY MEDICINE | Facility: CLINIC | Age: 79
End: 2024-11-26
Payer: MEDICARE

## 2024-11-26 VITALS
HEIGHT: 63 IN | WEIGHT: 149.38 LBS | RESPIRATION RATE: 18 BRPM | BODY MASS INDEX: 26.47 KG/M2 | OXYGEN SATURATION: 99 % | HEART RATE: 88 BPM | SYSTOLIC BLOOD PRESSURE: 135 MMHG | DIASTOLIC BLOOD PRESSURE: 85 MMHG

## 2024-11-26 DIAGNOSIS — N18.31 TYPE 2 DIABETES MELLITUS WITH STAGE 3A CHRONIC KIDNEY DISEASE, WITHOUT LONG-TERM CURRENT USE OF INSULIN: ICD-10-CM

## 2024-11-26 DIAGNOSIS — I25.10 CORONARY ARTERY DISEASE, UNSPECIFIED VESSEL OR LESION TYPE, UNSPECIFIED WHETHER ANGINA PRESENT, UNSPECIFIED WHETHER NATIVE OR TRANSPLANTED HEART: Primary | ICD-10-CM

## 2024-11-26 DIAGNOSIS — E11.22 TYPE 2 DIABETES MELLITUS WITH STAGE 3A CHRONIC KIDNEY DISEASE, WITHOUT LONG-TERM CURRENT USE OF INSULIN: ICD-10-CM

## 2024-11-26 DIAGNOSIS — Z95.1 HX OF FOUR VESSEL CORONARY ARTERY BYPASS GRAFT: ICD-10-CM

## 2024-11-26 PROCEDURE — 99213 OFFICE O/P EST LOW 20 MIN: CPT | Mod: ,,, | Performed by: NURSE PRACTITIONER

## 2024-11-26 NOTE — PROGRESS NOTES
Subjective     Patient ID: Frannie Wetzel is a 79 y.o. female.    Chief Complaint: Medication Problem (Pt is wanting to discuss medications ) and Hospital Follow Up ([facility] hospital follow up )    Patient presents today for hospital follow-up.  She underwent coronary artery bypass grafting on November 1st.  She had 4 vessel grafting done.  Venous harvesting was done from left lower extremity laparoscopically and is healing very well.  Chest incision is also healing very well without symptoms of infection.  She has had a follow up with her cardiologist locally and there were no medication changes made at that time.  She went to cardiac rehab at [facility] in [place] after her bypass surgery and felt that she may lots of progress in that setting.  She was started on Farxiga while she was hospitalized by her cardiologist.  She reports that the medication is very expensive in she we will probably not be able to afford that medication going forward.  She did have a voucher to get a 30 day supply of this medication.  We have been trying to adjust her diabetic medications for several months due to poorly controlled diabetes but she has financially not qualified for patient assistance but is unable to afford the medication without a patient assistance program.    She reports that she was receiving AC and HS fingerstick glucose checks while hospitalized which was very painful for her.  She does not have a continuous glucose monitor but is interested in receiving this device if insurance will allow.      Review of Systems   Constitutional:  Negative for fatigue, fever and unexpected weight change.   Respiratory:  Negative for cough, chest tightness and shortness of breath.    Cardiovascular:  Negative for chest pain, palpitations and leg swelling.   Gastrointestinal:  Negative for blood in stool, change in bowel habit, diarrhea, nausea, vomiting and reflux.   Endocrine: Negative for polydipsia and polyuria.    Genitourinary:  Negative for dysuria.   Musculoskeletal:  Negative for arthralgias and gait problem.   Neurological:  Negative for weakness.   Psychiatric/Behavioral:  Negative for sleep disturbance.         Denies depression  Denies anxiety          Objective     Physical Exam  Vitals and nursing note reviewed.   Constitutional:       General: She is not in acute distress.     Appearance: Normal appearance. She is not ill-appearing, toxic-appearing or diaphoretic.      Comments: frail   HENT:      Head: Normocephalic.      Mouth/Throat:      Mouth: Mucous membranes are moist.   Eyes:      General: No scleral icterus.     Pupils: Pupils are equal, round, and reactive to light.   Cardiovascular:      Rate and Rhythm: Normal rate and regular rhythm.      Pulses: Normal pulses.      Heart sounds: Normal heart sounds.   Pulmonary:      Effort: Pulmonary effort is normal. No respiratory distress.      Breath sounds: Normal breath sounds. No stridor. No wheezing, rhonchi or rales.   Chest:      Chest wall: Tenderness present.   Abdominal:      General: Bowel sounds are normal. There is no distension.      Palpations: Abdomen is soft.      Tenderness: There is no right CVA tenderness or left CVA tenderness.   Musculoskeletal:      Cervical back: Normal range of motion.      Right lower leg: No edema.      Left lower leg: No edema.   Skin:     General: Skin is warm and dry.      Capillary Refill: Capillary refill takes 2 to 3 seconds.      Comments: Chest incision edges well approximated.  Scabbing present without erythema or drainage present.   Neurological:      General: No focal deficit present.      Mental Status: She is alert and oriented to person, place, and time. Mental status is at baseline.      Gait: Gait normal.      Comments: Using a walker to ambulate   Psychiatric:         Mood and Affect: Mood normal.         Behavior: Behavior normal.         Thought Content: Thought content normal.         Judgment:  Judgment normal.            Assessment and Plan     I have encouraged her to talk to her local pharmacist about Farxiga caused and explore options for prescription coverage in addition to her Medicare plan.  She plans to do that today.  Also think she would benefit from a continuous blood glucose monitor and encouraged her to ask the pharmacist to help her select a plan that may cover that device as well.  I believe some awareness of food effect on her glucose would be beneficial in helping her manage diabetes.  She would also probably benefit greatly from GLP 1 therapy if we can find 1 that is affordable for her once she has a change in medication plan.         No follow-ups on file.

## 2024-12-02 DIAGNOSIS — K21.9 GASTROESOPHAGEAL REFLUX DISEASE, UNSPECIFIED WHETHER ESOPHAGITIS PRESENT: ICD-10-CM

## 2024-12-02 RX ORDER — PANTOPRAZOLE SODIUM 40 MG/1
40 TABLET, DELAYED RELEASE ORAL 2 TIMES DAILY
Qty: 180 TABLET | Refills: 3 | Status: SHIPPED | OUTPATIENT
Start: 2024-12-02

## 2024-12-23 ENCOUNTER — TELEPHONE (OUTPATIENT)
Dept: FAMILY MEDICINE | Facility: CLINIC | Age: 79
End: 2024-12-23
Payer: COMMERCIAL

## 2024-12-30 NOTE — TELEPHONE ENCOUNTER
This is the mechanism of action for this medication. She may need to change the time of day that she takes the medication. May also be due to elevated BG  levels. Focus on diet and try to change time of day she takes the medication to see if this helps. Thanks.

## 2025-01-06 DIAGNOSIS — K21.9 GASTROESOPHAGEAL REFLUX DISEASE, UNSPECIFIED WHETHER ESOPHAGITIS PRESENT: ICD-10-CM

## 2025-01-06 DIAGNOSIS — E03.9 HYPOTHYROIDISM, UNSPECIFIED TYPE: ICD-10-CM

## 2025-01-06 DIAGNOSIS — M1A.9XX0 CHRONIC GOUT INVOLVING TOE OF LEFT FOOT WITHOUT TOPHUS, UNSPECIFIED CAUSE: ICD-10-CM

## 2025-01-06 RX ORDER — PANTOPRAZOLE SODIUM 40 MG/1
40 TABLET, DELAYED RELEASE ORAL 2 TIMES DAILY
Qty: 180 TABLET | Refills: 3 | Status: SHIPPED | OUTPATIENT
Start: 2025-01-06

## 2025-01-06 RX ORDER — TRAZODONE HYDROCHLORIDE 50 MG/1
75 TABLET ORAL NIGHTLY
Qty: 90 TABLET | Refills: 0 | Status: SHIPPED | OUTPATIENT
Start: 2025-01-06

## 2025-01-06 RX ORDER — ALLOPURINOL 300 MG/1
300 TABLET ORAL DAILY
Qty: 90 TABLET | Refills: 1 | Status: SHIPPED | OUTPATIENT
Start: 2025-01-06

## 2025-01-06 RX ORDER — LEVOTHYROXINE SODIUM 25 UG/1
25 TABLET ORAL
Qty: 90 TABLET | Refills: 1 | Status: SHIPPED | OUTPATIENT
Start: 2025-01-06 | End: 2025-07-05

## 2025-01-06 RX ORDER — TRAZODONE HYDROCHLORIDE 50 MG/1
75 TABLET ORAL NIGHTLY
COMMUNITY
Start: 2024-11-22 | End: 2025-01-06 | Stop reason: SDUPTHER

## 2025-02-28 DIAGNOSIS — E78.5 HYPERLIPIDEMIA, UNSPECIFIED HYPERLIPIDEMIA TYPE: ICD-10-CM

## 2025-02-28 RX ORDER — GEMFIBROZIL 600 MG/1
600 TABLET, FILM COATED ORAL DAILY
Qty: 90 TABLET | Refills: 1 | Status: SHIPPED | OUTPATIENT
Start: 2025-02-28

## 2025-03-17 ENCOUNTER — OFFICE VISIT (OUTPATIENT)
Dept: FAMILY MEDICINE | Facility: CLINIC | Age: 80
End: 2025-03-17
Payer: MEDICARE

## 2025-03-17 VITALS
OXYGEN SATURATION: 99 % | RESPIRATION RATE: 16 BRPM | HEART RATE: 64 BPM | WEIGHT: 136 LBS | TEMPERATURE: 97 F | BODY MASS INDEX: 24.1 KG/M2 | SYSTOLIC BLOOD PRESSURE: 104 MMHG | DIASTOLIC BLOOD PRESSURE: 62 MMHG | HEIGHT: 63 IN

## 2025-03-17 DIAGNOSIS — I25.10 CORONARY ARTERY DISEASE, UNSPECIFIED VESSEL OR LESION TYPE, UNSPECIFIED WHETHER ANGINA PRESENT, UNSPECIFIED WHETHER NATIVE OR TRANSPLANTED HEART: ICD-10-CM

## 2025-03-17 DIAGNOSIS — E03.9 HYPOTHYROIDISM, UNSPECIFIED TYPE: ICD-10-CM

## 2025-03-17 DIAGNOSIS — E11.22 TYPE 2 DIABETES MELLITUS WITH STAGE 3A CHRONIC KIDNEY DISEASE, WITHOUT LONG-TERM CURRENT USE OF INSULIN: ICD-10-CM

## 2025-03-17 DIAGNOSIS — I10 ESSENTIAL HYPERTENSION: Primary | ICD-10-CM

## 2025-03-17 DIAGNOSIS — I48.20 CHRONIC ATRIAL FIBRILLATION: ICD-10-CM

## 2025-03-17 DIAGNOSIS — N18.31 CHRONIC KIDNEY DISEASE, STAGE 3A: ICD-10-CM

## 2025-03-17 DIAGNOSIS — D50.8 IRON DEFICIENCY ANEMIA SECONDARY TO INADEQUATE DIETARY IRON INTAKE: ICD-10-CM

## 2025-03-17 DIAGNOSIS — N18.31 TYPE 2 DIABETES MELLITUS WITH STAGE 3A CHRONIC KIDNEY DISEASE, WITHOUT LONG-TERM CURRENT USE OF INSULIN: ICD-10-CM

## 2025-03-17 DIAGNOSIS — R30.0 DYSURIA: ICD-10-CM

## 2025-03-17 DIAGNOSIS — E78.5 HYPERLIPIDEMIA, UNSPECIFIED HYPERLIPIDEMIA TYPE: ICD-10-CM

## 2025-03-17 DIAGNOSIS — Z11.59 ENCOUNTER FOR HEPATITIS C SCREENING TEST FOR LOW RISK PATIENT: ICD-10-CM

## 2025-03-17 DIAGNOSIS — N18.32 CHRONIC KIDNEY DISEASE, STAGE 3B: ICD-10-CM

## 2025-03-17 LAB
ALBUMIN SERPL BCP-MCNC: 3.2 G/DL (ref 3.4–4.8)
ALBUMIN/GLOB SERPL: 1 {RATIO}
ALP SERPL-CCNC: 83 U/L (ref 40–150)
ALT SERPL W P-5'-P-CCNC: <7 U/L
ANION GAP SERPL CALCULATED.3IONS-SCNC: 15 MMOL/L (ref 7–16)
AST SERPL W P-5'-P-CCNC: 15 U/L (ref 11–45)
BACTERIA #/AREA URNS HPF: ABNORMAL /HPF
BASOPHILS # BLD AUTO: 0.04 K/UL (ref 0–0.2)
BASOPHILS NFR BLD AUTO: 0.7 % (ref 0–1)
BILIRUB SERPL-MCNC: 0.4 MG/DL
BILIRUB UR QL STRIP: NEGATIVE
BUN SERPL-MCNC: 16 MG/DL (ref 10–20)
BUN/CREAT SERPL: 14 (ref 6–20)
CALCIUM SERPL-MCNC: 9.7 MG/DL (ref 8.4–10.2)
CHLORIDE SERPL-SCNC: 104 MMOL/L (ref 98–107)
CHOLEST SERPL-MCNC: 121 MG/DL
CHOLEST/HDLC SERPL: 3.5 {RATIO}
CLARITY UR: ABNORMAL
CO2 SERPL-SCNC: 22 MMOL/L (ref 23–31)
COLOR UR: ABNORMAL
CREAT SERPL-MCNC: 1.18 MG/DL (ref 0.55–1.02)
CREAT UR-MCNC: 56 MG/DL (ref 15–325)
DIFFERENTIAL METHOD BLD: ABNORMAL
EGFR (NO RACE VARIABLE) (RUSH/TITUS): 48 ML/MIN/1.73M2
EOSINOPHIL # BLD AUTO: 0.08 K/UL (ref 0–0.5)
EOSINOPHIL NFR BLD AUTO: 1.3 % (ref 1–4)
ERYTHROCYTE [DISTWIDTH] IN BLOOD BY AUTOMATED COUNT: 17.5 % (ref 11.5–14.5)
EST. AVERAGE GLUCOSE BLD GHB EST-MCNC: 154 MG/DL
GLOBULIN SER-MCNC: 3.3 G/DL (ref 2–4)
GLUCOSE SERPL-MCNC: 135 MG/DL (ref 82–115)
GLUCOSE UR STRIP-MCNC: >1000 MG/DL
HBA1C MFR BLD HPLC: 7 %
HCT VFR BLD AUTO: 32.2 % (ref 38–47)
HCV AB SER QL: NORMAL
HDLC SERPL-MCNC: 35 MG/DL (ref 35–60)
HGB BLD-MCNC: 9.2 G/DL (ref 12–16)
IMM GRANULOCYTES # BLD AUTO: 0.01 K/UL (ref 0–0.04)
IMM GRANULOCYTES NFR BLD: 0.2 % (ref 0–0.4)
IRON SATN MFR SERPL: 7 % (ref 20–50)
IRON SERPL-MCNC: 24 UG/DL (ref 50–170)
KETONES UR STRIP-SCNC: NEGATIVE MG/DL
LDLC SERPL CALC-MCNC: 47 MG/DL
LDLC/HDLC SERPL: 1.3 {RATIO}
LEUKOCYTE ESTERASE UR QL STRIP: ABNORMAL
LYMPHOCYTES # BLD AUTO: 1.2 K/UL (ref 1–4.8)
LYMPHOCYTES NFR BLD AUTO: 20.1 % (ref 27–41)
MCH RBC QN AUTO: 23.1 PG (ref 27–31)
MCHC RBC AUTO-ENTMCNC: 28.6 G/DL (ref 32–36)
MCV RBC AUTO: 80.7 FL (ref 80–96)
MICROALBUMIN UR-MCNC: 15.9 MG/DL
MICROALBUMIN/CREAT RATIO PNL UR: 283.9 MG/G (ref 0–30)
MONOCYTES # BLD AUTO: 0.44 K/UL (ref 0–0.8)
MONOCYTES NFR BLD AUTO: 7.4 % (ref 2–6)
MPC BLD CALC-MCNC: 12.2 FL (ref 9.4–12.4)
MUCOUS, UA: ABNORMAL /LPF
NEUTROPHILS # BLD AUTO: 4.19 K/UL (ref 1.8–7.7)
NEUTROPHILS NFR BLD AUTO: 70.3 % (ref 53–65)
NITRITE UR QL STRIP: NEGATIVE
NONHDLC SERPL-MCNC: 86 MG/DL
NRBC # BLD AUTO: 0 X10E3/UL
NRBC, AUTO (.00): 0 %
PH UR STRIP: 5.5 PH UNITS
PLATELET # BLD AUTO: 284 K/UL (ref 150–400)
POTASSIUM SERPL-SCNC: 3.7 MMOL/L (ref 3.5–5.1)
PROT SERPL-MCNC: 6.5 G/DL (ref 5.8–7.6)
PROT UR QL STRIP: 30
RBC # BLD AUTO: 3.99 M/UL (ref 4.2–5.4)
RBC # UR STRIP: ABNORMAL /UL
RBC #/AREA URNS HPF: 6 /HPF
SODIUM SERPL-SCNC: 137 MMOL/L (ref 136–145)
SP GR UR STRIP: 1.02
SQUAMOUS #/AREA URNS LPF: ABNORMAL /HPF
TIBC SERPL-MCNC: 305 UG/DL (ref 70–310)
TIBC SERPL-MCNC: 329 UG/DL (ref 250–450)
TRANSFERRIN SERPL-MCNC: 301 MG/DL (ref 173–360)
TRIGL SERPL-MCNC: 197 MG/DL (ref 37–140)
TSH SERPL DL<=0.005 MIU/L-ACNC: 1.71 UIU/ML (ref 0.35–4.94)
UROBILINOGEN UR STRIP-ACNC: NORMAL MG/DL
VLDLC SERPL-MCNC: 39 MG/DL
WBC # BLD AUTO: 5.96 K/UL (ref 4.5–11)
WBC #/AREA URNS HPF: >182 /HPF
WBC CLUMPS, UA: ABNORMAL /HPF

## 2025-03-17 PROCEDURE — 87086 URINE CULTURE/COLONY COUNT: CPT | Mod: ,,, | Performed by: CLINICAL MEDICAL LABORATORY

## 2025-03-17 PROCEDURE — 87186 SC STD MICRODIL/AGAR DIL: CPT | Mod: ,,, | Performed by: CLINICAL MEDICAL LABORATORY

## 2025-03-17 PROCEDURE — 82570 ASSAY OF URINE CREATININE: CPT | Mod: ,,, | Performed by: CLINICAL MEDICAL LABORATORY

## 2025-03-17 PROCEDURE — 96372 THER/PROPH/DIAG INJ SC/IM: CPT | Mod: ,,, | Performed by: NURSE PRACTITIONER

## 2025-03-17 PROCEDURE — 83540 ASSAY OF IRON: CPT | Mod: ,,, | Performed by: CLINICAL MEDICAL LABORATORY

## 2025-03-17 PROCEDURE — 84443 ASSAY THYROID STIM HORMONE: CPT | Mod: ,,, | Performed by: CLINICAL MEDICAL LABORATORY

## 2025-03-17 PROCEDURE — 80061 LIPID PANEL: CPT | Mod: ,,, | Performed by: CLINICAL MEDICAL LABORATORY

## 2025-03-17 PROCEDURE — 80053 COMPREHEN METABOLIC PANEL: CPT | Mod: ,,, | Performed by: CLINICAL MEDICAL LABORATORY

## 2025-03-17 PROCEDURE — 83550 IRON BINDING TEST: CPT | Mod: ,,, | Performed by: CLINICAL MEDICAL LABORATORY

## 2025-03-17 PROCEDURE — 83036 HEMOGLOBIN GLYCOSYLATED A1C: CPT | Mod: ,,, | Performed by: CLINICAL MEDICAL LABORATORY

## 2025-03-17 PROCEDURE — 81001 URINALYSIS AUTO W/SCOPE: CPT | Mod: ,,, | Performed by: CLINICAL MEDICAL LABORATORY

## 2025-03-17 PROCEDURE — 82043 UR ALBUMIN QUANTITATIVE: CPT | Mod: ,,, | Performed by: CLINICAL MEDICAL LABORATORY

## 2025-03-17 PROCEDURE — 85025 COMPLETE CBC W/AUTO DIFF WBC: CPT | Mod: ,,, | Performed by: CLINICAL MEDICAL LABORATORY

## 2025-03-17 PROCEDURE — 99214 OFFICE O/P EST MOD 30 MIN: CPT | Mod: 25,,, | Performed by: NURSE PRACTITIONER

## 2025-03-17 PROCEDURE — 87077 CULTURE AEROBIC IDENTIFY: CPT | Mod: ,,, | Performed by: CLINICAL MEDICAL LABORATORY

## 2025-03-17 PROCEDURE — 86803 HEPATITIS C AB TEST: CPT | Mod: ,,, | Performed by: CLINICAL MEDICAL LABORATORY

## 2025-03-17 PROCEDURE — 36415 COLL VENOUS BLD VENIPUNCTURE: CPT | Mod: ,,, | Performed by: NURSE PRACTITIONER

## 2025-03-17 RX ORDER — VALSARTAN 80 MG/1
80 TABLET ORAL DAILY
COMMUNITY

## 2025-03-17 RX ORDER — CEFTRIAXONE 1 G/1
1 INJECTION, POWDER, FOR SOLUTION INTRAMUSCULAR; INTRAVENOUS
Status: COMPLETED | OUTPATIENT
Start: 2025-03-17 | End: 2025-03-17

## 2025-03-17 RX ORDER — CLOPIDOGREL BISULFATE 75 MG/1
75 TABLET ORAL DAILY
COMMUNITY

## 2025-03-17 RX ORDER — CHOLECALCIFEROL (VITAMIN D3) 50 MCG
TABLET ORAL DAILY
COMMUNITY

## 2025-03-17 RX ADMIN — CEFTRIAXONE 1 G: 1 INJECTION, POWDER, FOR SOLUTION INTRAMUSCULAR; INTRAVENOUS at 12:03

## 2025-03-19 LAB — UA COMPLETE W REFLEX CULTURE PNL UR: ABNORMAL

## 2025-03-20 ENCOUNTER — RESULTS FOLLOW-UP (OUTPATIENT)
Dept: FAMILY MEDICINE | Facility: CLINIC | Age: 80
End: 2025-03-20

## 2025-03-20 RX ORDER — SULFAMETHOXAZOLE AND TRIMETHOPRIM 800; 160 MG/1; MG/1
1 TABLET ORAL 2 TIMES DAILY
Qty: 10 TABLET | Refills: 0 | Status: SHIPPED | OUTPATIENT
Start: 2025-03-20 | End: 2025-03-25

## 2025-03-31 NOTE — PROGRESS NOTES
Rosa Mcclelland NP   [address] [place] [address]     PATIENT NAME: Frannie Wetzel  : 1945  DATE: 3/17/25  MRN: [id number]      Billing Provider: Rosa Mcclelland NP  Level of Service: [place] OFFICE/OUTPT VISIT, EST, LEVL IV, 30-39 MIN  Patient PCP Information       Provider PCP Type    Rosa Mcclelland NP General            Reason for Visit / Chief Complaint: Follow-up (6 month followup HTN, hyperlipidemia and thyroid ) and Urinary Tract Infection (Dysuria and frequent urination X 1 to 2 weeks )       Update PCP  Update Chief Complaint         History of Present Illness / Problem Focused Workflow     Frannie Wetzel presents to the clinic with Follow-up (6 month followup HTN, hyperlipidemia and thyroid ) and Urinary Tract Infection (Dysuria and frequent urination X 1 to 2 weeks )     History of Present Illness    HPI:  Patient reports symptoms consistent with a UTI, including burning sensation and increased urinary frequency. These symptoms began shortly after she returned home from a hospital or rehabilitation stay. She has significant nocturia, needing to urinate 7-10 times per night, which has severely disrupted her sleep. The previous night was somewhat better, as she was able to sleep but still woke up when needing to urinate.    She describes pain in her upper body, which Dr. Murcia attributed to nerve healing following a surgical procedure where nerves were cut. She characterizes this pain as feeling like it is in her bones rather than at the incision site. The pain location varies, sometimes appearing in different areas from day to day, and on one occasion, it was present in her breast.    She notes difficulty wearing a bra for extended periods, suggesting discomfort in the chest area.    Regarding her medical history, she recalls having an iron deficiency as a teenager and believes there might have been a mention of it a few years ago, though she expresses uncertainty due to memory issues.    She  reports taking numerous medications, approximately 20 per day. She expresses frustration about the high number of medications, mentioning that she had hoped Dr. Murcia would reduce her medication regimen, but instead, he prescribed an additional medication.    MEDICATIONS:  Patient reports taking approximately 20 pills a day.    MEDICAL HISTORY:  Patient has a history of Urinary Tract Infection (UTI) and iron deficiency.    SURGICAL HISTORY:  She recently underwent surgery involving nerve cutting, which is causing pain around the surgical site.    TEST RESULTS:  Patient is able to check her blood sugar at home recently. Her iron levels were potentially low in the past.    ALLERGIES:  Patient is allergic to Augmentin.      ROS:  General: -fever, -chills, -fatigue, -weight gain, -weight loss  Eyes: -vision changes, -redness, -discharge  ENT: -ear pain, -nasal congestion, -sore throat  Cardiovascular: -chest pain, -palpitations, -lower extremity edema  Respiratory: -cough, -shortness of breath  Gastrointestinal: -abdominal pain, -nausea, -vomiting, -diarrhea, -constipation, -blood in stool  Genitourinary: +dysuria, -hematuria, +frequency  Musculoskeletal: -joint pain, -muscle pain  Skin: -rash, -lesion  Neurological: -headache, -dizziness, -numbness, -tingling, +difficulty staying asleep, +nerve pain, +memory loss, +memory problems  Psychiatric: -anxiety, -depression, -sleep difficulty  Breasts: +breast pain                Medical / Social / Family History     Past Medical History:   Diagnosis Date    Diabetes mellitus, type 2     Hyperlipidemia     Hypothyroidism        Past Surgical History:   Procedure Laterality Date    ADENOIDECTOMY      CHOLECYSTECTOMY      TONSILLECTOMY         Social History  Ms.  reports that she has never smoked. She has never been exposed to tobacco smoke. She has never used smokeless tobacco. She reports that she does not drink alcohol and does not use drugs.    Family History  Ms.'s family  history includes Alzheimer's disease in her mother; Diabetes in her mother; Heart disease in her father and mother; Hypertension in her mother.    Medications and Allergies     Medications  Outpatient Medications Marked as Taking for the 3/17/25 encounter (Office Visit) with Rosa Mcclelland NP   Medication Sig Dispense Refill    alendronate (FOSAMAX) 70 MG tablet Take one PO once per week on an empty stomach one hour prior to other meds or food 12 tablet 2    allopurinoL (ZYLOPRIM) 300 MG tablet Take 1 tablet (300 mg total) by mouth once daily. 90 tablet 1    amLODIPine (NORVASC) 5 MG tablet Take 5 mg by mouth once daily.      aspirin 81 MG Chew Take 81 mg by mouth once daily.      atorvastatin (LIPITOR) 80 MG tablet Take 80 mg by mouth once daily.      cholecalciferol, vitamin D3, (VITAMIN D3) 50 mcg (2,000 unit) Tab Take by mouth once daily.      clopidogreL (PLAVIX) 75 mg tablet Take 75 mg by mouth once daily.      dapagliflozin propanediol (FARXIGA) 10 mg tablet Take 10 mg by mouth once daily.      gabapentin (NEURONTIN) 300 MG capsule Take 1 capsule (300 mg total) by mouth 3 (three) times daily. 270 capsule 1    gemfibroziL (LOPID) 600 MG tablet Take 1 tablet (600 mg total) by mouth once daily. 90 tablet 1    glimepiride (AMARYL) 1 MG tablet Take 0.5 tablets (0.5 mg total) by mouth before breakfast. 45 tablet 3    latanoprost 0.005 % ophthalmic solution Place 1 drop into both eyes every evening.      levothyroxine (SYNTHROID) 25 MCG tablet Take 1 tablet (25 mcg total) by mouth before breakfast. 90 tablet 1    metoprolol succinate (TOPROL-XL) 25 MG 24 hr tablet Take 25 mg by mouth once daily.      pantoprazole (PROTONIX) 40 MG tablet Take 1 tablet (40 mg total) by mouth 2 (two) times daily. 180 tablet 3    traZODone (DESYREL) 50 MG tablet Take 1.5 tablets (75 mg total) by mouth every evening. 90 tablet 0    valsartan (DIOVAN) 80 MG tablet Take 80 mg by mouth once daily.         Allergies  Review of patient's  "allergies indicates:   Allergen Reactions    Augmentin [amoxicillin-pot clavulanate] Diarrhea    Amoxapine Rash    Ropinirole Nausea And Vomiting       Physical Examination   /62 (BP Location: Left arm, Patient Position: Sitting)   Pulse 64   Temp 97.1 °F (36.2 °C) (Temporal)   Resp 16   Ht 5' 3" (1.6 m)   Wt 61.7 kg (136 lb)   SpO2 99%   BMI 24.09 kg/m²    Physical Exam    General: No acute distress. Well-developed. Well-nourished.  Eyes: EOMI. Sclerae anicteric.  HENT: Normocephalic. Atraumatic. Nares patent. Moist oral mucosa.  Cardiovascular: Regular rate. Regular rhythm. No murmurs. No rubs. No gallops. Normal S1, S2.  Respiratory: Normal respiratory effort. Clear to auscultation bilaterally. No rales. No rhonchi. No wheezing.  Musculoskeletal: No  obvious deformity.  Extremities: No lower extremity edema.  Neurological: Alert & oriented x3. No slurred speech. Normal gait.  Psychiatric: Normal mood. Normal affect. Good insight. Good judgment.  Skin: Warm. Dry. No rash.           Assessment and Plan (including Health Maintenance)      Problem List  Smart Sets  Document Outside HM   :    Assessment & Plan    IMPRESSION:  - Assessed for potential UTI based on reported symptoms of burning and frequency.  - Considered treating UTI empirically with Rocephin shot and oral antibiotic, but decided to wait for urine culture results before initiating treatment.  - Evaluated pain, which was attributed to nerve healing post-surgery.  - Considered checking iron levels due to past history of iron deficiency.    - Instructed patient to continue monitoring blood sugar at home.    URINARY TRACT INFECTION:  - Administered Rocephin injection to address potential UTI symptoms.  - Ordered urine culture to confirm diagnosis and guide antibiotic selection.  - Patient advised to contact the office for culture results in a few days, after which oral antibiotics may be prescribed.  - Recommend increased fluid intake and " rest.    - Noted patient's reports of pain in various locations following a surgical procedure.  - Confirmed pain is likely due to nerve healing after surgery.    IRON DEFICIENCY ANEMIA:  - Ordered labs, including possible serum ferritin level check.  - Instructed the patient to contact the office for lab results the next day.    - Noted patient's reports of frequent nocturnal micturition, up to 7-10 times per night.  - Observed slight improvement in patient's sleep due to nocturia, but patient reports difficulty falling back asleep after waking up.    ALLERGY STATUS:  - Documented patient's known allergy to Augmentin.              Health Maintenance Due   Topic Date Due    TETANUS VACCINE  Never done    Shingles Vaccine (1 of 2) Never done    RSV Vaccine (Age 60+ and Pregnant patients) (1 - 1-dose 75+ series) Never done    COVID-19 Vaccine (4 - 2024-25 season) 09/01/2024       Problem List Items Addressed This Visit          Cardiac/Vascular    Essential hypertension - Primary    Relevant Orders    CBC Auto Differential (Completed)    Comprehensive Metabolic Panel (Completed)    Hyperlipidemia    Relevant Orders    Lipid Panel (Completed)    Chronic atrial fibrillation       Renal/    Chronic kidney disease, stage 3a    Chronic kidney disease, stage 3b    Relevant Orders    Comprehensive Metabolic Panel (Completed)       Oncology    Iron deficiency anemia secondary to inadequate dietary iron intake    Relevant Orders    Iron and TIBC (Completed)       Endocrine    Type 2 diabetes mellitus with stage 3a chronic kidney disease, without long-term current use of insulin (Chronic)    Relevant Orders    Hemoglobin A1C (Completed)    Microalbumin/Creatinine Ratio, Urine (Completed)    Hypothyroidism (Chronic)    Relevant Orders    TSH (Completed)     Other Visit Diagnoses         Coronary artery disease, unspecified vessel or lesion type, unspecified whether angina present, unspecified whether native or transplanted  heart          Encounter for hepatitis C screening test for low risk patient        Relevant Orders    Hepatitis C Antibody (Completed)      Dysuria        Relevant Orders    Urinalysis, Reflex to Urine Culture (Completed)    Urinalysis, Microscopic (Completed)    Urine culture (Completed)            Health Maintenance Topics with due status: Not Due       Topic Last Completion Date    DEXA Scan 08/17/2023    Diabetic Eye Exam 11/03/2024    Aspirin/Antiplatelet Therapy 03/17/2025    Diabetes Urine Screening 03/17/2025    Lipid Panel 03/17/2025    Hemoglobin A1c 03/17/2025       No future appointments.         Signature:  Rosa Mcclelland NP      [address]  [place] [address]    Date of encounter: 3/17/25

## 2025-04-28 DIAGNOSIS — M81.0 OSTEOPOROSIS, UNSPECIFIED OSTEOPOROSIS TYPE, UNSPECIFIED PATHOLOGICAL FRACTURE PRESENCE: ICD-10-CM

## 2025-04-28 NOTE — TELEPHONE ENCOUNTER
----- Message from Jihan sent at 4/28/2025  1:22 PM CDT -----  Regarding: refill  Who Called: Frannie Orta or New Rx:RefillRX Name and Strength:alendronate (FOSAMAX) 70 MG tabletamLODIPine (NORVASC) 5 MG tabletThe Pharmacy [address] [address]Tlrzt: [phone] Fax: [phone] Provider: KACEY Cowan Method of Contact: Phone CallPatient's Preferred Phone Number on File: [phone]

## 2025-04-29 RX ORDER — ALENDRONATE SODIUM 70 MG/1
TABLET ORAL
Qty: 12 TABLET | Refills: 2 | Status: SHIPPED | OUTPATIENT
Start: 2025-04-29

## 2025-04-29 RX ORDER — AMLODIPINE BESYLATE 5 MG/1
5 TABLET ORAL DAILY
Qty: 90 TABLET | Refills: 0 | Status: SHIPPED | OUTPATIENT
Start: 2025-04-29

## 2025-04-30 ENCOUNTER — OFFICE VISIT (OUTPATIENT)
Dept: FAMILY MEDICINE | Facility: CLINIC | Age: 80
End: 2025-04-30
Payer: MEDICARE

## 2025-04-30 VITALS
HEART RATE: 55 BPM | WEIGHT: 137.63 LBS | BODY MASS INDEX: 24.39 KG/M2 | TEMPERATURE: 97 F | DIASTOLIC BLOOD PRESSURE: 68 MMHG | RESPIRATION RATE: 20 BRPM | SYSTOLIC BLOOD PRESSURE: 138 MMHG | OXYGEN SATURATION: 99 % | HEIGHT: 63 IN

## 2025-04-30 DIAGNOSIS — E11.22 TYPE 2 DIABETES MELLITUS WITH STAGE 3A CHRONIC KIDNEY DISEASE, WITHOUT LONG-TERM CURRENT USE OF INSULIN: ICD-10-CM

## 2025-04-30 DIAGNOSIS — R42 VERTIGO: Primary | ICD-10-CM

## 2025-04-30 DIAGNOSIS — N18.31 TYPE 2 DIABETES MELLITUS WITH STAGE 3A CHRONIC KIDNEY DISEASE, WITHOUT LONG-TERM CURRENT USE OF INSULIN: ICD-10-CM

## 2025-04-30 PROCEDURE — 99213 OFFICE O/P EST LOW 20 MIN: CPT | Mod: ,,, | Performed by: NURSE PRACTITIONER

## 2025-04-30 RX ORDER — MECLIZINE HYDROCHLORIDE 25 MG/1
25 TABLET ORAL 3 TIMES DAILY PRN
Qty: 30 TABLET | Refills: 2 | Status: SHIPPED | OUTPATIENT
Start: 2025-04-30

## 2025-04-30 RX ORDER — METFORMIN HYDROCHLORIDE 500 MG/1
500 TABLET, EXTENDED RELEASE ORAL 2 TIMES DAILY WITH MEALS
COMMUNITY
End: 2025-04-30 | Stop reason: SDUPTHER

## 2025-04-30 RX ORDER — METFORMIN HYDROCHLORIDE 500 MG/1
500 TABLET, EXTENDED RELEASE ORAL 2 TIMES DAILY WITH MEALS
Qty: 180 TABLET | Refills: 2 | Status: SHIPPED | OUTPATIENT
Start: 2025-04-30

## 2025-06-23 DIAGNOSIS — M1A.9XX0 CHRONIC GOUT INVOLVING TOE OF LEFT FOOT WITHOUT TOPHUS, UNSPECIFIED CAUSE: ICD-10-CM

## 2025-06-23 DIAGNOSIS — E03.9 HYPOTHYROIDISM, UNSPECIFIED TYPE: ICD-10-CM

## 2025-06-24 RX ORDER — LEVOTHYROXINE SODIUM 25 UG/1
25 TABLET ORAL
Qty: 90 TABLET | Refills: 1 | Status: SHIPPED | OUTPATIENT
Start: 2025-06-24 | End: 2025-12-21

## 2025-06-24 RX ORDER — ALLOPURINOL 300 MG/1
300 TABLET ORAL DAILY
Qty: 90 TABLET | Refills: 1 | Status: SHIPPED | OUTPATIENT
Start: 2025-06-24

## 2025-07-08 DIAGNOSIS — E11.42 POLYNEUROPATHY DUE TO TYPE 2 DIABETES MELLITUS: ICD-10-CM

## 2025-07-08 RX ORDER — DAPAGLIFLOZIN 10 MG/1
10 TABLET, FILM COATED ORAL DAILY
Qty: 90 TABLET | Refills: 3 | Status: SHIPPED | OUTPATIENT
Start: 2025-07-08

## 2025-07-08 RX ORDER — GABAPENTIN 300 MG/1
300 CAPSULE ORAL 3 TIMES DAILY
Qty: 270 CAPSULE | Refills: 1 | Status: SHIPPED | OUTPATIENT
Start: 2025-07-08 | End: 2026-01-04

## 2025-07-08 NOTE — TELEPHONE ENCOUNTER
Copied from CRM #[id number]. Topic: Medications - Medication Refill  >> Jul 7, 2025  8:44 AM Delfino Kay wrote:  Who Called: Frannie Wetzel    Refill or New Rx:Refill  RX Name and Strength:gabapentin (NEURONTIN) 300 MG capsule  dapagliflozin propanediol (FARXIGA) 10 mg tablet    Local or Mail Order:[facility] [address] [address]  Phone: [phone] Fax: [phone]    location and phone number: [phone]     Ordering Provider:Rosa Mcclelland NP    Preferred Method of Contact: Phone Call  Patient's Preferred Phone Number on File: [phone]   Alert and oriented to person, place and time/Patient baseline mental status

## 2025-07-23 RX ORDER — AMLODIPINE BESYLATE 5 MG/1
5 TABLET ORAL DAILY
Qty: 90 TABLET | Refills: 0 | Status: SHIPPED | OUTPATIENT
Start: 2025-07-23

## 2025-07-23 NOTE — TELEPHONE ENCOUNTER
Copied from CRM #[id number]. Topic: Medications - Medication Refill  >> Jul 23, 2025  2:47 PM Nimo wrote:  Who Called: Frannie Wetzel    Refill or New Rx:Refill  RX Name and Strength:amLODIPine (NORVASC) 5 MG tablet  How is the patient currently taking it? (ex. 1XDay):1xday  Is this a 30 day or 90 day RX:90  Local or Mail Order:local  List of preferred pharmacies on file (remove unneeded): [unfilled][facility], [place] - [address] [place] [address]  Ordering Provider:Rosa Mcclelland    Patient's Preferred Phone Number on File: [phone]

## 2025-07-29 ENCOUNTER — OFFICE VISIT (OUTPATIENT)
Dept: FAMILY MEDICINE | Facility: CLINIC | Age: 80
End: 2025-07-29
Payer: MEDICARE

## 2025-07-29 VITALS
BODY MASS INDEX: 25.11 KG/M2 | HEART RATE: 60 BPM | DIASTOLIC BLOOD PRESSURE: 70 MMHG | SYSTOLIC BLOOD PRESSURE: 140 MMHG | OXYGEN SATURATION: 99 % | RESPIRATION RATE: 16 BRPM | HEIGHT: 63 IN | WEIGHT: 141.69 LBS | TEMPERATURE: 98 F

## 2025-07-29 DIAGNOSIS — E11.22 TYPE 2 DIABETES MELLITUS WITH STAGE 3A CHRONIC KIDNEY DISEASE, WITHOUT LONG-TERM CURRENT USE OF INSULIN: Primary | Chronic | ICD-10-CM

## 2025-07-29 DIAGNOSIS — E03.9 HYPOTHYROIDISM, UNSPECIFIED TYPE: Chronic | ICD-10-CM

## 2025-07-29 DIAGNOSIS — N18.32 CHRONIC KIDNEY DISEASE, STAGE 3B: ICD-10-CM

## 2025-07-29 DIAGNOSIS — D50.8 IRON DEFICIENCY ANEMIA SECONDARY TO INADEQUATE DIETARY IRON INTAKE: ICD-10-CM

## 2025-07-29 DIAGNOSIS — K21.9 GASTROESOPHAGEAL REFLUX DISEASE, UNSPECIFIED WHETHER ESOPHAGITIS PRESENT: Chronic | ICD-10-CM

## 2025-07-29 DIAGNOSIS — I10 ESSENTIAL HYPERTENSION: ICD-10-CM

## 2025-07-29 DIAGNOSIS — N18.31 TYPE 2 DIABETES MELLITUS WITH STAGE 3A CHRONIC KIDNEY DISEASE, WITHOUT LONG-TERM CURRENT USE OF INSULIN: Primary | Chronic | ICD-10-CM

## 2025-07-29 DIAGNOSIS — I48.20 CHRONIC ATRIAL FIBRILLATION: ICD-10-CM

## 2025-07-29 DIAGNOSIS — E11.42 POLYNEUROPATHY DUE TO TYPE 2 DIABETES MELLITUS: Chronic | ICD-10-CM

## 2025-07-29 DIAGNOSIS — E78.5 HYPERLIPIDEMIA, UNSPECIFIED HYPERLIPIDEMIA TYPE: ICD-10-CM

## 2025-07-29 DIAGNOSIS — R26.81 UNSTEADY GAIT: ICD-10-CM

## 2025-07-29 DIAGNOSIS — E55.9 VITAMIN D DEFICIENCY: ICD-10-CM

## 2025-07-29 LAB
25(OH)D3 SERPL-MCNC: 47.8 NG/ML (ref 30–80)
ALBUMIN SERPL BCP-MCNC: 3.6 G/DL (ref 3.4–4.8)
ALBUMIN/GLOB SERPL: 0.9 {RATIO}
ALP SERPL-CCNC: 85 U/L (ref 40–150)
ALT SERPL W P-5'-P-CCNC: 11 U/L
ANION GAP SERPL CALCULATED.3IONS-SCNC: 13 MMOL/L (ref 7–16)
ANISOCYTOSIS BLD QL SMEAR: ABNORMAL
AST SERPL W P-5'-P-CCNC: 21 U/L (ref 11–45)
BASOPHILS # BLD AUTO: 0.03 K/UL (ref 0–0.2)
BASOPHILS NFR BLD AUTO: 0.4 % (ref 0–1)
BILIRUB SERPL-MCNC: 0.3 MG/DL
BUN SERPL-MCNC: 21 MG/DL (ref 10–20)
BUN/CREAT SERPL: 20 (ref 6–20)
CALCIUM SERPL-MCNC: 10.4 MG/DL (ref 8.4–10.2)
CHLORIDE SERPL-SCNC: 108 MMOL/L (ref 98–107)
CHOLEST SERPL-MCNC: 139 MG/DL
CHOLEST/HDLC SERPL: 3.2 {RATIO}
CO2 SERPL-SCNC: 23 MMOL/L (ref 23–31)
CREAT SERPL-MCNC: 1.03 MG/DL (ref 0.55–1.02)
EGFR (NO RACE VARIABLE) (RUSH/TITUS): 56 ML/MIN/1.73M2
EOSINOPHIL # BLD AUTO: 0.12 K/UL (ref 0–0.5)
EOSINOPHIL NFR BLD AUTO: 1.5 % (ref 1–4)
ERYTHROCYTE [DISTWIDTH] IN BLOOD BY AUTOMATED COUNT: 18.3 % (ref 11.5–14.5)
EST. AVERAGE GLUCOSE BLD GHB EST-MCNC: 151 MG/DL
GLOBULIN SER-MCNC: 3.8 G/DL (ref 2–4)
GLUCOSE SERPL-MCNC: 117 MG/DL (ref 82–115)
HBA1C MFR BLD HPLC: 6.9 %
HCT VFR BLD AUTO: 35.6 % (ref 38–47)
HDLC SERPL-MCNC: 43 MG/DL (ref 35–60)
HGB BLD-MCNC: 10.2 G/DL (ref 12–16)
IMM GRANULOCYTES # BLD AUTO: 0.04 K/UL (ref 0–0.04)
IMM GRANULOCYTES NFR BLD: 0.5 % (ref 0–0.4)
IRON SATN MFR SERPL: 8 % (ref 20–50)
IRON SERPL-MCNC: 30 UG/DL (ref 50–170)
LDLC SERPL CALC-MCNC: 68 MG/DL
LDLC/HDLC SERPL: 1.6 {RATIO}
LYMPHOCYTES # BLD AUTO: 1.43 K/UL (ref 1–4.8)
LYMPHOCYTES NFR BLD AUTO: 17.8 % (ref 27–41)
MCH RBC QN AUTO: 23.6 PG (ref 27–31)
MCHC RBC AUTO-ENTMCNC: 28.7 G/DL (ref 32–36)
MCV RBC AUTO: 82.4 FL (ref 80–96)
MONOCYTES # BLD AUTO: 0.56 K/UL (ref 0–0.8)
MONOCYTES NFR BLD AUTO: 7 % (ref 2–6)
MPC BLD CALC-MCNC: 12.3 FL (ref 9.4–12.4)
NEUTROPHILS # BLD AUTO: 5.87 K/UL (ref 1.8–7.7)
NEUTROPHILS NFR BLD AUTO: 72.8 % (ref 53–65)
NONHDLC SERPL-MCNC: 96 MG/DL
NRBC # BLD AUTO: 0 X10E3/UL
NRBC, AUTO (.00): 0 %
OVALOCYTES BLD QL SMEAR: ABNORMAL
PLATELET # BLD AUTO: 262 K/UL (ref 150–400)
PLATELET MORPHOLOGY: ABNORMAL
POTASSIUM SERPL-SCNC: 4.2 MMOL/L (ref 3.5–5.1)
PROT SERPL-MCNC: 7.4 G/DL (ref 5.8–7.6)
RBC # BLD AUTO: 4.32 M/UL (ref 4.2–5.4)
SODIUM SERPL-SCNC: 140 MMOL/L (ref 136–145)
TIBC SERPL-MCNC: 349 UG/DL (ref 70–310)
TIBC SERPL-MCNC: 379 UG/DL (ref 250–450)
TRANSFERRIN SERPL-MCNC: 353 MG/DL (ref 173–360)
TRIGL SERPL-MCNC: 141 MG/DL (ref 37–140)
TSH SERPL DL<=0.005 MIU/L-ACNC: 1.83 UIU/ML (ref 0.35–4.94)
VLDLC SERPL-MCNC: 28 MG/DL
WBC # BLD AUTO: 8.05 K/UL (ref 4.5–11)

## 2025-07-29 PROCEDURE — 99214 OFFICE O/P EST MOD 30 MIN: CPT | Mod: ,,, | Performed by: NURSE PRACTITIONER

## 2025-07-29 NOTE — PROGRESS NOTES
Rosa Mcclelland NP   [address] [place] [address]     PATIENT NAME: Frannie Wetzel  : 1945  DATE: 25  MRN: [id number]      Billing Provider: Rosa Mcclelland NP  Level of Service: [place] OFFICE/OUTPT VISIT, EST, LEVL IV, 30-39 MIN  Patient PCP Information       Provider PCP Type    Rosa Mcclelland NP General            Reason for Visit / Chief Complaint: Follow-up (3 month)       Update PCP  Update Chief Complaint         History of Present Illness / Problem Focused Workflow     Frannie Wetzel presents to the clinic with Follow-up (3 month)     History of Present Illness    HPI:  Patient reports ongoing balance problems that have been getting slightly worse. She describes sudden episodes where she may have to take a step to one side or the other to regain balance, or unexpectedly back up while walking. She can walk fine most of the time, but these sudden balance issues occur intermittently. She has a cane but does not use it regularly, preferring to rely on her  for support when walking. She mentions having had recent surgery.    Patient reports a 3-week episode of vertigo that significantly impacted her balance and daily activities. She tried taking Meclizine for the vertigo, but it did not provide immediate relief as it usually does. The vertigo has now cleared up. She has previously undergone physical therapy for her balance issues and was doing well, but has not been consistent with her exercises due to other health issues arising. She expresses frustration at dealing with multiple health problems sequentially since her surgery.    Patient denies any significant memory problems or issues recalling names of people she sees regularly or everyday items. She denies frequently losing her train of thought.    MEDICATIONS:  Patient is on Meclizine (Mecazine) as needed for vertigo.    MEDICAL HISTORY:  Patient has a history of vertigo, with a recent episode lasting 3 weeks. She also experiences balance  issues.    SURGICAL HISTORY:  Patient recently underwent surgery.    SOCIAL HISTORY:  Occupation: Worked at hospital for 28 years    Marital status:       ROS:  General: -fever, -chills, -fatigue, -weight gain, -weight loss  Eyes: -vision changes, -redness, -discharge  ENT: -ear pain, -nasal congestion, -sore throat  Cardiovascular: -chest pain, -palpitations, -lower extremity edema  Respiratory: -cough, -shortness of breath  Gastrointestinal: -abdominal pain, -nausea, -vomiting, -diarrhea, -constipation, -blood in stool  Genitourinary: -dysuria, -hematuria, -frequency  Musculoskeletal: -joint pain, -muscle pain  Skin: -rash, -lesion  Neurological: -headache, -dizziness, -numbness, -tingling, +balance issues, +abnormal gait, +vertigo, +confusion or disorientation  Psychiatric: -anxiety, -depression, -sleep difficulty                Medical / Social / Family History     Past Medical History:   Diagnosis Date    Diabetes mellitus, type 2     Hyperlipidemia     Hypothyroidism        Past Surgical History:   Procedure Laterality Date    ADENOIDECTOMY      CHOLECYSTECTOMY      TONSILLECTOMY         Social History  Ms.  reports that she has never smoked. She has never been exposed to tobacco smoke. She has never used smokeless tobacco. She reports that she does not drink alcohol and does not use drugs.    Family History  Ms.'s family history includes Alzheimer's disease in her mother; Diabetes in her mother; Heart disease in her father and mother; Hypertension in her mother.    Medications and Allergies     Medications  Outpatient Medications Marked as Taking for the 7/29/25 encounter (Office Visit) with Rosa Mcclelland NP   Medication Sig Dispense Refill    alendronate (FOSAMAX) 70 MG tablet Take one PO once per week on an empty stomach one hour prior to other meds or food 12 tablet 2    allopurinoL (ZYLOPRIM) 300 MG tablet Take 1 tablet (300 mg total) by mouth once daily. 90 tablet 1    amLODIPine (NORVASC) 5 MG  tablet Take 1 tablet (5 mg total) by mouth once daily. 90 tablet 0    aspirin 81 MG Chew Take 81 mg by mouth once daily.      atorvastatin (LIPITOR) 80 MG tablet Take 80 mg by mouth once daily.      cholecalciferol, vitamin D3, (VITAMIN D3) 50 mcg (2,000 unit) Tab Take by mouth once daily.      clopidogreL (PLAVIX) 75 mg tablet Take 75 mg by mouth once daily.      dapagliflozin propanediol (FARXIGA) 10 mg tablet Take 1 tablet (10 mg total) by mouth once daily. 90 tablet 3    gabapentin (NEURONTIN) 300 MG capsule Take 1 capsule (300 mg total) by mouth 3 (three) times daily. 270 capsule 1    gemfibroziL (LOPID) 600 MG tablet Take 1 tablet (600 mg total) by mouth once daily. 90 tablet 1    glimepiride (AMARYL) 1 MG tablet Take 0.5 tablets (0.5 mg total) by mouth before breakfast. 45 tablet 3    latanoprost 0.005 % ophthalmic solution Place 1 drop into both eyes every evening.      levothyroxine (SYNTHROID) 25 MCG tablet Take 1 tablet (25 mcg total) by mouth before breakfast. 90 tablet 1    meclizine (ANTIVERT) 25 mg tablet Take 1 tablet (25 mg total) by mouth 3 (three) times daily as needed for Dizziness. 30 tablet 2    metFORMIN (GLUCOPHAGE-XR) 500 MG ER 24hr tablet Take 1 tablet (500 mg total) by mouth 2 (two) times daily with meals. 180 tablet 2    metoprolol succinate (TOPROL-XL) 25 MG 24 hr tablet Take 25 mg by mouth once daily.      pantoprazole (PROTONIX) 40 MG tablet Take 1 tablet (40 mg total) by mouth 2 (two) times daily. 180 tablet 3    traZODone (DESYREL) 50 MG tablet Take 1.5 tablets (75 mg total) by mouth every evening. 90 tablet 0    valsartan (DIOVAN) 80 MG tablet Take 80 mg by mouth once daily.         Allergies  Review of patient's allergies indicates:   Allergen Reactions    Augmentin [amoxicillin-pot clavulanate] Diarrhea    Flexeril [cyclobenzaprine] Other (See Comments)     Causes numbness in legs    Amoxapine Rash    Ropinirole Nausea And Vomiting       Physical Examination   BP (!) 140/70 (BP  "Location: Left arm, Patient Position: Sitting)   Pulse 60   Temp 98 °F (36.7 °C) (Oral)   Resp 16   Ht 5' 3" (1.6 m)   Wt 64.3 kg (141 lb 11.2 oz)   SpO2 99%   BMI 25.10 kg/m²    Physical Exam    General: No acute distress. Well-developed. Well-nourished.  Eyes: EOMI. Sclerae anicteric.  HENT: Normocephalic. Atraumatic. Nares patent. Moist oral mucosa.  Cardiovascular: Regular rate. Regular rhythm. No murmurs. No rubs. No gallops. Normal S1, S2.  Respiratory: Normal respiratory effort. Clear to auscultation bilaterally. No rales. No rhonchi. No wheezing.  Musculoskeletal: No  obvious deformity.  Extremities: No lower extremity edema.  Neurological: Alert & oriented x3. No slurred speech. Normal gait.  Psychiatric: Normal mood. Normal affect. Good insight. Good judgment.  Skin: Warm. Dry. No rash.           Assessment and Plan (including Health Maintenance)      Problem List  Smart Sets  Document Outside HM   :    Assessment & Plan    - Evaluated that the patient's balance issue is worsening but remains consistent with previous assessments.  - Patient reports trouble with balance, needing to take steps to avoid falling, and sometimes backing up while walking.  - Considered potential neurological or musculoskeletal causes for these issues.  - After weighing options between specialty referral and conservative management, decided to defer neurological/orthopedic referral for now.  - Instructed the patient to resume previously prescribed physical therapy exercises at home to strengthen muscles and improve balance.  - Advised the patient to use a cane or walker when walking without assistance, especially in public places, to prevent falls.  - Evaluated cognitive function through discussion of memory and recall abilities.    - Noted that the patient uses a cane and sometimes a walker, and relies on  for support to prevent falls.  - Patient's  assists with mobility and provides necessary " support.    FOLLOW-UP:  - Ordered repeat labs to check current status.  - Patient to contact the office tomorrow for lab results.        This note was generated with the assistance of ambient listening technology. Verbal consent was obtained by the patient and accompanying visitor(s) for the recording of patient appointment to facilitate this note. I attest to having reviewed and edited the generated note for accuracy, though some syntax or spelling errors may persist. Please contact the author of this note for any clarification.        Health Maintenance Due   Topic Date Due    TETANUS VACCINE  Never done    Shingles Vaccine (1 of 2) Never done    RSV Vaccine (Age 60+ and Pregnant patients) (1 - 1-dose 75+ series) Never done    COVID-19 Vaccine (4 - 2024-25 season) 09/01/2024    DEXA Scan  08/17/2025       Problem List Items Addressed This Visit          Neuro    Polyneuropathy due to type 2 diabetes mellitus (Chronic)       Cardiac/Vascular    Essential hypertension    Relevant Orders    CBC Auto Differential    Comprehensive Metabolic Panel    Hyperlipidemia    Relevant Orders    Lipid Panel    Chronic atrial fibrillation       Renal/    Chronic kidney disease, stage 3b    Relevant Orders    Comprehensive Metabolic Panel       Oncology    Iron deficiency anemia secondary to inadequate dietary iron intake    Relevant Orders    CBC Auto Differential    Iron and TIBC    Vitamin D       Endocrine    Type 2 diabetes mellitus with stage 3a chronic kidney disease, without long-term current use of insulin - Primary (Chronic)    Relevant Orders    Hemoglobin A1C    Hypothyroidism (Chronic)    Relevant Orders    TSH       GI    Gastroesophageal reflux disease (Chronic)     Other Visit Diagnoses         Vitamin D deficiency        Relevant Orders    Vitamin D            Health Maintenance Topics with due status: Not Due       Topic Last Completion Date    Diabetic Eye Exam 11/03/2024    Influenza Vaccine 11/26/2024     Diabetes Urine Screening 03/17/2025    Lipid Panel 03/17/2025    Hemoglobin A1c 03/17/2025    Aspirin/Antiplatelet Therapy 07/29/2025       Future Appointments   Date Time Provider Department Center   10/28/2025  8:40 AM Rosa Mcclelland NP [facility] [place]            Signature:  Rosa Mcclelland NP      [address]   [place] [address]    Date of encounter: 7/29/25

## 2025-08-01 ENCOUNTER — PATIENT MESSAGE (OUTPATIENT)
Facility: HOSPITAL | Age: 80
End: 2025-08-01
Payer: MEDICARE

## 2025-08-12 DIAGNOSIS — R42 VERTIGO: ICD-10-CM

## 2025-08-12 RX ORDER — MECLIZINE HYDROCHLORIDE 25 MG/1
25 TABLET ORAL 3 TIMES DAILY PRN
Qty: 30 TABLET | Refills: 5 | Status: SHIPPED | OUTPATIENT
Start: 2025-08-12